# Patient Record
Sex: FEMALE | Race: BLACK OR AFRICAN AMERICAN | NOT HISPANIC OR LATINO | Employment: OTHER | RURAL
[De-identification: names, ages, dates, MRNs, and addresses within clinical notes are randomized per-mention and may not be internally consistent; named-entity substitution may affect disease eponyms.]

---

## 2021-03-24 ENCOUNTER — OFFICE VISIT (OUTPATIENT)
Dept: SPINE | Facility: CLINIC | Age: 71
End: 2021-03-24
Payer: COMMERCIAL

## 2021-03-24 ENCOUNTER — HOSPITAL ENCOUNTER (OUTPATIENT)
Dept: RADIOLOGY | Facility: HOSPITAL | Age: 71
Discharge: HOME | End: 2021-03-24
Attending: ORTHOPAEDIC SURGERY
Payer: COMMERCIAL

## 2021-03-24 DIAGNOSIS — M54.2 NECK PAIN: Primary | ICD-10-CM

## 2021-03-24 DIAGNOSIS — G95.9 CERVICAL MYELOPATHY: ICD-10-CM

## 2021-03-24 DIAGNOSIS — M54.12 CERVICAL RADICULOPATHY: ICD-10-CM

## 2021-03-24 DIAGNOSIS — M54.2 NECK PAIN: ICD-10-CM

## 2021-03-24 PROCEDURE — 72050 XR CERVICAL SPINE AP LAT WITH FLEX EXTEN: ICD-10-PCS | Mod: 26,,, | Performed by: ORTHOPAEDIC SURGERY

## 2021-03-24 PROCEDURE — 99999 PR PBB SHADOW E&M-NEW PATIENT-LVL IV: CPT | Mod: PBBFAC,,, | Performed by: ORTHOPAEDIC SURGERY

## 2021-03-24 PROCEDURE — 72050 X-RAY EXAM NECK SPINE 4/5VWS: CPT | Mod: TC

## 2021-03-24 PROCEDURE — 72050 X-RAY EXAM NECK SPINE 4/5VWS: CPT | Mod: 26,,, | Performed by: ORTHOPAEDIC SURGERY

## 2021-03-24 PROCEDURE — 99999 PR PBB SHADOW E&M-NEW PATIENT-LVL IV: ICD-10-PCS | Mod: PBBFAC,,, | Performed by: ORTHOPAEDIC SURGERY

## 2021-03-24 PROCEDURE — 99214 OFFICE O/P EST MOD 30 MIN: CPT | Mod: S$PBB,,, | Performed by: ORTHOPAEDIC SURGERY

## 2021-03-24 PROCEDURE — 99204 OFFICE O/P NEW MOD 45 MIN: CPT | Mod: PBBFAC,25 | Performed by: ORTHOPAEDIC SURGERY

## 2021-03-24 PROCEDURE — 99214 PR OFFICE/OUTPT VISIT, EST, LEVL IV, 30-39 MIN: ICD-10-PCS | Mod: S$PBB,,, | Performed by: ORTHOPAEDIC SURGERY

## 2021-03-24 RX ORDER — IPRATROPIUM BROMIDE AND ALBUTEROL SULFATE 2.5; .5 MG/3ML; MG/3ML
SOLUTION RESPIRATORY (INHALATION)
COMMUNITY
Start: 2021-02-02 | End: 2021-08-05

## 2021-03-24 RX ORDER — TIZANIDINE 4 MG/1
4 TABLET ORAL EVERY 8 HOURS PRN
COMMUNITY
End: 2021-03-31 | Stop reason: SDUPTHER

## 2021-03-24 RX ORDER — QUETIAPINE FUMARATE 25 MG/1
25 TABLET, FILM COATED ORAL
COMMUNITY

## 2021-03-24 RX ORDER — ISOSORBIDE DINITRATE 10 MG/1
10 TABLET ORAL
COMMUNITY

## 2021-03-24 RX ORDER — AMITRIPTYLINE HYDROCHLORIDE 25 MG/1
TABLET, FILM COATED ORAL
COMMUNITY
Start: 2021-03-11 | End: 2021-06-09 | Stop reason: SDUPTHER

## 2021-03-24 RX ORDER — MONTELUKAST SODIUM 10 MG/1
TABLET ORAL
COMMUNITY
Start: 2021-03-10 | End: 2021-07-13

## 2021-03-24 RX ORDER — RABEPRAZOLE SODIUM 20 MG/1
TABLET, DELAYED RELEASE ORAL
COMMUNITY
Start: 2021-03-11 | End: 2021-12-28

## 2021-03-24 RX ORDER — PREDNISONE 10 MG/1
TABLET ORAL
COMMUNITY
Start: 2021-03-19 | End: 2021-07-13

## 2021-03-24 RX ORDER — MIRABEGRON 25 MG/1
TABLET, FILM COATED, EXTENDED RELEASE ORAL
COMMUNITY

## 2021-03-24 RX ORDER — PANTOPRAZOLE SODIUM 40 MG/1
40 TABLET, DELAYED RELEASE ORAL
COMMUNITY
End: 2021-07-13

## 2021-03-24 RX ORDER — GABAPENTIN 600 MG/1
TABLET ORAL
COMMUNITY
End: 2021-06-09 | Stop reason: CLARIF

## 2021-03-24 RX ORDER — CHOLECALCIFEROL (VITAMIN D3) 25 MCG
1000 TABLET ORAL DAILY
COMMUNITY

## 2021-03-24 RX ORDER — DULOXETIN HYDROCHLORIDE 60 MG/1
60 CAPSULE, DELAYED RELEASE ORAL
COMMUNITY
End: 2021-06-09

## 2021-03-24 RX ORDER — FERROUS GLUCONATE 324(38)MG
324 TABLET ORAL 2 TIMES DAILY
COMMUNITY

## 2021-03-24 RX ORDER — TOPIRAMATE 25 MG/1
TABLET ORAL
COMMUNITY
Start: 2020-12-26 | End: 2021-07-13

## 2021-03-24 RX ORDER — AZITHROMYCIN 250 MG/1
TABLET, FILM COATED ORAL
COMMUNITY
Start: 2021-03-10 | End: 2021-07-13

## 2021-03-24 RX ORDER — CARVEDILOL 12.5 MG/1
12.5 TABLET ORAL
Status: ON HOLD | COMMUNITY
End: 2021-12-02

## 2021-03-24 RX ORDER — GABAPENTIN 600 MG/1
TABLET ORAL
COMMUNITY
Start: 2021-03-11 | End: 2021-06-09 | Stop reason: SDUPTHER

## 2021-03-24 RX ORDER — LISINOPRIL 10 MG/1
10 TABLET ORAL
COMMUNITY

## 2021-03-24 RX ORDER — LEVOTHYROXINE SODIUM 100 UG/1
100 TABLET ORAL
COMMUNITY
End: 2021-07-13

## 2021-03-24 RX ORDER — FUROSEMIDE 20 MG/1
TABLET ORAL
COMMUNITY
Start: 2021-03-10 | End: 2021-07-13

## 2021-03-24 RX ORDER — DULOXETIN HYDROCHLORIDE 30 MG/1
CAPSULE, DELAYED RELEASE ORAL
COMMUNITY
Start: 2021-03-11 | End: 2021-06-09 | Stop reason: SDUPTHER

## 2021-03-24 RX ORDER — MONTELUKAST SODIUM 10 MG/1
10 TABLET ORAL DAILY
COMMUNITY

## 2021-03-24 RX ORDER — MYCOPHENOLATE MOFETIL 500 MG/1
TABLET ORAL
COMMUNITY
Start: 2021-02-24

## 2021-03-24 RX ORDER — DIPHENOXYLATE HYDROCHLORIDE AND ATROPINE SULFATE 2.5; .025 MG/1; MG/1
TABLET ORAL
COMMUNITY
Start: 2021-03-11 | End: 2021-07-13

## 2021-03-24 RX ORDER — METOCLOPRAMIDE 5 MG/1
5 TABLET ORAL
COMMUNITY
End: 2021-12-28

## 2021-03-24 RX ORDER — AMLODIPINE BESYLATE 10 MG/1
10 TABLET ORAL
COMMUNITY

## 2021-03-24 RX ORDER — LEVOTHYROXINE SODIUM 75 UG/1
75 TABLET ORAL DAILY
COMMUNITY
Start: 2021-03-12

## 2021-03-24 RX ORDER — IPRATROPIUM BROMIDE AND ALBUTEROL SULFATE 2.5; .5 MG/3ML; MG/3ML
3 SOLUTION RESPIRATORY (INHALATION) EVERY 4 HOURS PRN
COMMUNITY

## 2021-03-24 RX ORDER — ISOSORBIDE MONONITRATE 30 MG/1
TABLET, EXTENDED RELEASE ORAL
COMMUNITY
Start: 2021-02-02 | End: 2021-07-13

## 2021-03-24 RX ORDER — NITROGLYCERIN 0.4 MG/1
0.4 TABLET SUBLINGUAL EVERY 5 MIN PRN
COMMUNITY

## 2021-03-24 RX ORDER — CHLORHEXIDINE GLUCONATE ORAL RINSE 1.2 MG/ML
SOLUTION DENTAL
COMMUNITY
Start: 2021-01-20 | End: 2021-07-13

## 2021-03-24 RX ORDER — HYDROCODONE BITARTRATE AND ACETAMINOPHEN 10; 325 MG/1; MG/1
TABLET ORAL
COMMUNITY
Start: 2021-03-15 | End: 2021-06-09

## 2021-03-24 RX ORDER — KETOROLAC TROMETHAMINE 10 MG/1
10 TABLET, FILM COATED ORAL EVERY 6 HOURS PRN
Status: ON HOLD | COMMUNITY
End: 2021-12-06 | Stop reason: HOSPADM

## 2021-03-24 RX ORDER — HYDROCODONE BITARTRATE AND ACETAMINOPHEN 10; 325 MG/1; MG/1
1 TABLET ORAL EVERY 6 HOURS PRN
COMMUNITY
End: 2021-06-09

## 2021-03-24 RX ORDER — OMEPRAZOLE 20 MG/1
CAPSULE, DELAYED RELEASE ORAL
COMMUNITY
Start: 2021-01-26 | End: 2021-07-13

## 2021-03-24 RX ORDER — POTASSIUM CHLORIDE 1.5 G/1.58G
20 POWDER, FOR SOLUTION ORAL 2 TIMES DAILY
COMMUNITY

## 2021-03-24 RX ORDER — ASPIRIN 81 MG/1
81 TABLET ORAL
COMMUNITY

## 2021-03-24 RX ORDER — TOPIRAMATE 25 MG/1
25 TABLET ORAL 2 TIMES DAILY
COMMUNITY
End: 2021-12-28

## 2021-03-24 RX ORDER — OXYBUTYNIN CHLORIDE
5 POWDER (GRAM) MISCELLANEOUS
COMMUNITY
End: 2021-07-13

## 2021-03-24 RX ORDER — SIMVASTATIN 40 MG/1
40 TABLET, FILM COATED ORAL
COMMUNITY

## 2021-03-24 RX ORDER — MIRABEGRON 50 MG/1
TABLET, FILM COATED, EXTENDED RELEASE ORAL
COMMUNITY
Start: 2021-03-11 | End: 2021-07-13

## 2021-03-24 RX ORDER — DULOXETIN HYDROCHLORIDE 60 MG/1
CAPSULE, DELAYED RELEASE ORAL
COMMUNITY
Start: 2021-02-24 | End: 2021-06-09

## 2021-03-24 RX ORDER — FUROSEMIDE 20 MG/1
20 TABLET ORAL DAILY
COMMUNITY

## 2021-03-24 RX ORDER — LEFLUNOMIDE 20 MG/1
20 TABLET ORAL
COMMUNITY
End: 2021-07-13

## 2021-03-24 RX ORDER — HYDROXYCHLOROQUINE SULFATE 200 MG/1
TABLET, FILM COATED ORAL
COMMUNITY
End: 2021-07-13

## 2021-03-24 RX ORDER — PREDNISONE 5 MG/1
TABLET ORAL
COMMUNITY
Start: 2021-01-26 | End: 2021-07-13

## 2021-03-31 DIAGNOSIS — M47.816 LUMBAR SPONDYLOSIS: Primary | ICD-10-CM

## 2021-03-31 RX ORDER — TIZANIDINE 4 MG/1
4 TABLET ORAL EVERY 8 HOURS
Qty: 90 TABLET | Refills: 2 | Status: SHIPPED | OUTPATIENT
Start: 2021-03-31 | End: 2021-06-09 | Stop reason: SDUPTHER

## 2021-06-09 ENCOUNTER — OFFICE VISIT (OUTPATIENT)
Dept: PAIN MEDICINE | Facility: CLINIC | Age: 71
End: 2021-06-09
Payer: COMMERCIAL

## 2021-06-09 VITALS
WEIGHT: 138 LBS | HEIGHT: 63 IN | HEART RATE: 96 BPM | BODY MASS INDEX: 24.45 KG/M2 | RESPIRATION RATE: 18 BRPM | SYSTOLIC BLOOD PRESSURE: 129 MMHG | DIASTOLIC BLOOD PRESSURE: 75 MMHG

## 2021-06-09 DIAGNOSIS — Z79.899 ENCOUNTER FOR LONG-TERM (CURRENT) USE OF OTHER MEDICATIONS: ICD-10-CM

## 2021-06-09 DIAGNOSIS — M47.816 LUMBAR SPONDYLOSIS: ICD-10-CM

## 2021-06-09 DIAGNOSIS — M32.9 LUPUS: Primary | Chronic | ICD-10-CM

## 2021-06-09 DIAGNOSIS — M47.817 LUMBOSACRAL SPONDYLOSIS WITHOUT MYELOPATHY: Chronic | ICD-10-CM

## 2021-06-09 LAB
CTP QC/QA: YES
POC (AMP) AMPHETAMINE: NEGATIVE
POC (BAR) BARBITURATES: NEGATIVE
POC (BUP) BUPRENORPHINE: NEGATIVE
POC (BZO) BENZODIAZEPINES: NEGATIVE
POC (COC) COCAINE: NEGATIVE
POC (MDMA) METHYLENEDIOXYMETHAMPHETAMINE 3,4: NEGATIVE
POC (MET) METHAMPHETAMINE: NEGATIVE
POC (MOP) OPIATES: ABNORMAL
POC (MTD) METHADONE: NEGATIVE
POC (OXY) OXYCODONE: NEGATIVE
POC (PCP) PHENCYCLIDINE: NEGATIVE
POC (TCA) TRICYCLIC ANTIDEPRESSANTS: NEGATIVE
POC TEMPERATURE (URINE): 92
POC THC: NEGATIVE

## 2021-06-09 PROCEDURE — 80305 DRUG TEST PRSMV DIR OPT OBS: CPT | Mod: PBBFAC | Performed by: PHYSICIAN ASSISTANT

## 2021-06-09 PROCEDURE — 99214 OFFICE O/P EST MOD 30 MIN: CPT | Mod: S$PBB,25,, | Performed by: PHYSICIAN ASSISTANT

## 2021-06-09 PROCEDURE — 99215 OFFICE O/P EST HI 40 MIN: CPT | Mod: PBBFAC,25 | Performed by: PHYSICIAN ASSISTANT

## 2021-06-09 PROCEDURE — 99214 PR OFFICE/OUTPT VISIT, EST, LEVL IV, 30-39 MIN: ICD-10-PCS | Mod: S$PBB,25,, | Performed by: PHYSICIAN ASSISTANT

## 2021-06-09 PROCEDURE — 96372 THER/PROPH/DIAG INJ SC/IM: CPT | Mod: PBBFAC | Performed by: PHYSICIAN ASSISTANT

## 2021-06-09 RX ORDER — AMITRIPTYLINE HYDROCHLORIDE 25 MG/1
25 TABLET, FILM COATED ORAL NIGHTLY
Qty: 30 TABLET | Refills: 2 | Status: SHIPPED | OUTPATIENT
Start: 2021-06-09 | End: 2021-09-09 | Stop reason: SDUPTHER

## 2021-06-09 RX ORDER — BUDESONIDE, GLYCOPYRROLATE, AND FORMOTEROL FUMARATE 160; 9; 4.8 UG/1; UG/1; UG/1
AEROSOL, METERED RESPIRATORY (INHALATION)
COMMUNITY

## 2021-06-09 RX ORDER — HYDROCODONE BITARTRATE AND ACETAMINOPHEN 10; 325 MG/1; MG/1
1 TABLET ORAL EVERY 6 HOURS
Qty: 120 TABLET | Refills: 0 | Status: SHIPPED | OUTPATIENT
Start: 2021-07-13 | End: 2021-09-09 | Stop reason: SDUPTHER

## 2021-06-09 RX ORDER — TIZANIDINE 4 MG/1
4 TABLET ORAL EVERY 8 HOURS
Qty: 90 TABLET | Refills: 2 | Status: ON HOLD | OUTPATIENT
Start: 2021-06-09 | End: 2021-09-08 | Stop reason: HOSPADM

## 2021-06-09 RX ORDER — TRIAMCINOLONE ACETONIDE 40 MG/ML
40 INJECTION, SUSPENSION INTRA-ARTICULAR; INTRAMUSCULAR
Status: COMPLETED | OUTPATIENT
Start: 2021-06-09 | End: 2021-06-09

## 2021-06-09 RX ORDER — HYDROCODONE BITARTRATE AND ACETAMINOPHEN 10; 325 MG/1; MG/1
1 TABLET ORAL EVERY 6 HOURS
Qty: 120 TABLET | Refills: 0 | Status: SHIPPED | OUTPATIENT
Start: 2021-08-12 | End: 2021-07-13

## 2021-06-09 RX ORDER — HYDROCODONE BITARTRATE AND ACETAMINOPHEN 10; 325 MG/1; MG/1
1 TABLET ORAL EVERY 6 HOURS
Qty: 120 TABLET | Refills: 0 | Status: SHIPPED | OUTPATIENT
Start: 2021-06-12 | End: 2021-07-12

## 2021-06-09 RX ORDER — GABAPENTIN 600 MG/1
600 TABLET ORAL EVERY 8 HOURS
Qty: 90 TABLET | Refills: 2 | Status: SHIPPED | OUTPATIENT
Start: 2021-06-09 | End: 2021-09-08 | Stop reason: SDUPTHER

## 2021-06-09 RX ORDER — DULOXETIN HYDROCHLORIDE 30 MG/1
30 CAPSULE, DELAYED RELEASE ORAL 2 TIMES DAILY
Qty: 60 CAPSULE | Refills: 2 | Status: SHIPPED | OUTPATIENT
Start: 2021-06-09 | End: 2021-09-09 | Stop reason: SDUPTHER

## 2021-06-09 RX ADMIN — TRIAMCINOLONE ACETONIDE 40 MG: 40 INJECTION, SUSPENSION INTRA-ARTICULAR; INTRAMUSCULAR at 01:06

## 2021-06-30 ENCOUNTER — OFFICE VISIT (OUTPATIENT)
Dept: SPINE | Facility: CLINIC | Age: 71
End: 2021-06-30
Payer: COMMERCIAL

## 2021-06-30 DIAGNOSIS — M50.30 DDD (DEGENERATIVE DISC DISEASE), CERVICAL: ICD-10-CM

## 2021-06-30 DIAGNOSIS — M54.9 DORSALGIA, UNSPECIFIED: ICD-10-CM

## 2021-06-30 DIAGNOSIS — M51.36 DDD (DEGENERATIVE DISC DISEASE), LUMBAR: ICD-10-CM

## 2021-06-30 DIAGNOSIS — M54.12 RADICULOPATHY, CERVICAL REGION: ICD-10-CM

## 2021-06-30 DIAGNOSIS — Z98.1 ARTHRODESIS STATUS: ICD-10-CM

## 2021-06-30 DIAGNOSIS — M47.817 LUMBOSACRAL SPONDYLOSIS WITHOUT MYELOPATHY: Primary | ICD-10-CM

## 2021-06-30 DIAGNOSIS — M54.16 LUMBAR RADICULOPATHY: ICD-10-CM

## 2021-06-30 PROCEDURE — 99215 OFFICE O/P EST HI 40 MIN: CPT | Mod: PBBFAC | Performed by: ORTHOPAEDIC SURGERY

## 2021-06-30 PROCEDURE — 99214 PR OFFICE/OUTPT VISIT, EST, LEVL IV, 30-39 MIN: ICD-10-PCS | Mod: S$PBB,,, | Performed by: ORTHOPAEDIC SURGERY

## 2021-06-30 PROCEDURE — 99214 OFFICE O/P EST MOD 30 MIN: CPT | Mod: S$PBB,,, | Performed by: ORTHOPAEDIC SURGERY

## 2021-07-13 ENCOUNTER — HOSPITAL ENCOUNTER (OUTPATIENT)
Dept: RADIOLOGY | Facility: HOSPITAL | Age: 71
Discharge: HOME | End: 2021-07-13
Attending: ORTHOPAEDIC SURGERY
Payer: COMMERCIAL

## 2021-07-13 VITALS
SYSTOLIC BLOOD PRESSURE: 144 MMHG | TEMPERATURE: 98 F | WEIGHT: 144 LBS | OXYGEN SATURATION: 98 % | HEIGHT: 63 IN | DIASTOLIC BLOOD PRESSURE: 80 MMHG | HEART RATE: 91 BPM | RESPIRATION RATE: 16 BRPM | BODY MASS INDEX: 25.52 KG/M2

## 2021-07-13 DIAGNOSIS — M54.9 DORSALGIA, UNSPECIFIED: ICD-10-CM

## 2021-07-13 DIAGNOSIS — M54.12 RADICULOPATHY, CERVICAL REGION: ICD-10-CM

## 2021-07-13 DIAGNOSIS — M47.817 LUMBOSACRAL SPONDYLOSIS WITHOUT MYELOPATHY: ICD-10-CM

## 2021-07-13 DIAGNOSIS — Z98.1 ARTHRODESIS STATUS: ICD-10-CM

## 2021-07-13 LAB
APTT PPP: 25.2 SECONDS (ref 25.2–37.3)
BASOPHILS # BLD AUTO: 0.03 K/UL (ref 0–0.2)
BASOPHILS NFR BLD AUTO: 0.4 % (ref 0–1)
DIFFERENTIAL METHOD BLD: ABNORMAL
EOSINOPHIL # BLD AUTO: 0.14 K/UL (ref 0–0.5)
EOSINOPHIL NFR BLD AUTO: 1.9 % (ref 1–4)
ERYTHROCYTE [DISTWIDTH] IN BLOOD BY AUTOMATED COUNT: 13.7 % (ref 11.5–14.5)
HCT VFR BLD AUTO: 35.6 % (ref 38–47)
HGB BLD-MCNC: 10.9 G/DL (ref 12–16)
HOLD SPECIMEN: NORMAL
IMM GRANULOCYTES # BLD AUTO: 0.15 K/UL (ref 0–0.04)
IMM GRANULOCYTES NFR BLD: 2 % (ref 0–0.4)
INR BLD: 0.9 (ref 0.9–1.1)
LYMPHOCYTES # BLD AUTO: 1.21 K/UL (ref 1–4.8)
LYMPHOCYTES NFR BLD AUTO: 16.4 % (ref 27–41)
MCH RBC QN AUTO: 25.7 PG (ref 27–31)
MCHC RBC AUTO-ENTMCNC: 30.6 G/DL (ref 32–36)
MCV RBC AUTO: 84 FL (ref 80–96)
MONOCYTES # BLD AUTO: 0.76 K/UL (ref 0–0.8)
MONOCYTES NFR BLD AUTO: 10.3 % (ref 2–6)
MPC BLD CALC-MCNC: 11.3 FL (ref 9.4–12.4)
NEUTROPHILS # BLD AUTO: 5.07 K/UL (ref 1.8–7.7)
NEUTROPHILS NFR BLD AUTO: 69 % (ref 53–65)
NRBC # BLD AUTO: 0 X10E3/UL
NRBC, AUTO (.00): 0 %
OVALOCYTES BLD QL SMEAR: ABNORMAL
PLATELET # BLD AUTO: 233 K/UL (ref 150–400)
PLATELET MORPHOLOGY: ABNORMAL
POLYCHROMASIA BLD QL SMEAR: ABNORMAL
PROTHROMBIN TIME: 12.2 SECONDS (ref 11.7–14.7)
RBC # BLD AUTO: 4.24 M/UL (ref 4.2–5.4)
WBC # BLD AUTO: 7.36 K/UL (ref 4.5–11)

## 2021-07-13 PROCEDURE — 72132 CT LUMBAR SPINE W/DYE: CPT | Mod: TC

## 2021-07-13 PROCEDURE — 85730 THROMBOPLASTIN TIME PARTIAL: CPT | Performed by: RADIOLOGY

## 2021-07-13 PROCEDURE — 72132 CT LUMBAR SPINE W/DYE: CPT | Mod: 26,,, | Performed by: RADIOLOGY

## 2021-07-13 PROCEDURE — 63600175 PHARM REV CODE 636 W HCPCS: Performed by: RADIOLOGY

## 2021-07-13 PROCEDURE — 72126 CT NECK SPINE W/DYE: CPT | Mod: 26,,, | Performed by: RADIOLOGY

## 2021-07-13 PROCEDURE — 85025 COMPLETE CBC W/AUTO DIFF WBC: CPT | Performed by: RADIOLOGY

## 2021-07-13 PROCEDURE — 85610 PROTHROMBIN TIME: CPT | Performed by: RADIOLOGY

## 2021-07-13 PROCEDURE — 72126 CT CERVICAL SPINE WITH CONTRAST: ICD-10-PCS | Mod: 26,,, | Performed by: RADIOLOGY

## 2021-07-13 PROCEDURE — 25000003 PHARM REV CODE 250: Performed by: RADIOLOGY

## 2021-07-13 PROCEDURE — 72132 CT LUMBAR SPINE WITH CONTRAST: ICD-10-PCS | Mod: 26,,, | Performed by: RADIOLOGY

## 2021-07-13 PROCEDURE — 25500020 PHARM REV CODE 255: Performed by: ORTHOPAEDIC SURGERY

## 2021-07-13 PROCEDURE — 36415 COLL VENOUS BLD VENIPUNCTURE: CPT | Performed by: RADIOLOGY

## 2021-07-13 PROCEDURE — 72126 CT NECK SPINE W/DYE: CPT | Mod: TC

## 2021-07-13 PROCEDURE — 27201268 FL MYELOGRAM 2 OR MORE REGIONS

## 2021-07-13 RX ORDER — IOPAMIDOL 408 MG/ML
12 INJECTION, SOLUTION INTRATHECAL
Status: COMPLETED | OUTPATIENT
Start: 2021-07-13 | End: 2021-07-13

## 2021-07-13 RX ORDER — FAMOTIDINE 10 MG/ML
20 INJECTION INTRAVENOUS 2 TIMES DAILY
Status: DISCONTINUED | OUTPATIENT
Start: 2021-07-13 | End: 2021-07-14 | Stop reason: HOSPADM

## 2021-07-13 RX ORDER — DIAZEPAM 5 MG/1
5 TABLET ORAL ONCE
Status: COMPLETED | OUTPATIENT
Start: 2021-07-13 | End: 2021-07-13

## 2021-07-13 RX ORDER — METHYLPREDNISOLONE SOD SUCC 125 MG
125 VIAL (EA) INJECTION EVERY 6 HOURS
Status: DISCONTINUED | OUTPATIENT
Start: 2021-07-13 | End: 2021-07-14 | Stop reason: HOSPADM

## 2021-07-13 RX ORDER — SODIUM CHLORIDE 450 MG/100ML
INJECTION, SOLUTION INTRAVENOUS ONCE
Status: COMPLETED | OUTPATIENT
Start: 2021-07-13 | End: 2021-07-13

## 2021-07-13 RX ORDER — DIPHENHYDRAMINE HYDROCHLORIDE 50 MG/ML
50 INJECTION INTRAMUSCULAR; INTRAVENOUS ONCE
Status: COMPLETED | OUTPATIENT
Start: 2021-07-13 | End: 2021-07-13

## 2021-07-13 RX ADMIN — FAMOTIDINE 20 MG: 10 INJECTION, SOLUTION INTRAVENOUS at 10:07

## 2021-07-13 RX ADMIN — DIAZEPAM 5 MG: 5 TABLET ORAL at 10:07

## 2021-07-13 RX ADMIN — METHYLPREDNISOLONE SODIUM SUCCINATE 125 MG: 125 INJECTION, POWDER, FOR SOLUTION INTRAMUSCULAR; INTRAVENOUS at 11:07

## 2021-07-13 RX ADMIN — IOPAMIDOL 12 ML: 408 INJECTION, SOLUTION INTRATHECAL at 12:07

## 2021-07-13 RX ADMIN — DIPHENHYDRAMINE HYDROCHLORIDE 50 MG: 50 INJECTION INTRAMUSCULAR; INTRAVENOUS at 10:07

## 2021-07-13 RX ADMIN — SODIUM CHLORIDE: 4.5 INJECTION, SOLUTION INTRAVENOUS at 09:07

## 2021-07-19 ENCOUNTER — OFFICE VISIT (OUTPATIENT)
Dept: SPINE | Facility: CLINIC | Age: 71
End: 2021-07-19
Payer: COMMERCIAL

## 2021-07-19 DIAGNOSIS — M51.36 DDD (DEGENERATIVE DISC DISEASE), LUMBAR: ICD-10-CM

## 2021-07-19 DIAGNOSIS — M54.16 LUMBAR RADICULOPATHY: ICD-10-CM

## 2021-07-19 DIAGNOSIS — M54.12 RADICULOPATHY, CERVICAL REGION: ICD-10-CM

## 2021-07-19 DIAGNOSIS — G95.9 CERVICAL MYELOPATHY: Primary | ICD-10-CM

## 2021-07-19 DIAGNOSIS — M50.30 DDD (DEGENERATIVE DISC DISEASE), CERVICAL: ICD-10-CM

## 2021-07-19 DIAGNOSIS — M48.062 LUMBAR STENOSIS WITH NEUROGENIC CLAUDICATION: ICD-10-CM

## 2021-07-19 PROCEDURE — 99213 OFFICE O/P EST LOW 20 MIN: CPT | Mod: PBBFAC | Performed by: ORTHOPAEDIC SURGERY

## 2021-07-19 PROCEDURE — 99214 PR OFFICE/OUTPT VISIT, EST, LEVL IV, 30-39 MIN: ICD-10-PCS | Mod: S$PBB,,, | Performed by: ORTHOPAEDIC SURGERY

## 2021-07-19 PROCEDURE — 99214 OFFICE O/P EST MOD 30 MIN: CPT | Mod: S$PBB,,, | Performed by: ORTHOPAEDIC SURGERY

## 2021-07-19 RX ORDER — SODIUM CHLORIDE 9 MG/ML
INJECTION, SOLUTION INTRAVENOUS CONTINUOUS
Status: CANCELLED | OUTPATIENT
Start: 2021-07-19

## 2021-07-20 ENCOUNTER — TELEPHONE (OUTPATIENT)
Dept: SPINE | Facility: CLINIC | Age: 71
End: 2021-07-20

## 2021-08-05 RX ORDER — NITROFURANTOIN 25; 75 MG/1; MG/1
CAPSULE ORAL
COMMUNITY
Start: 2021-05-24 | End: 2021-12-28

## 2021-08-05 RX ORDER — CIPROFLOXACIN 500 MG/1
TABLET ORAL
COMMUNITY
Start: 2021-05-14

## 2021-08-05 RX ORDER — DULOXETIN HYDROCHLORIDE 60 MG/1
CAPSULE, DELAYED RELEASE ORAL
Status: ON HOLD | COMMUNITY
Start: 2021-07-08 | End: 2021-09-08

## 2021-08-05 RX ORDER — POTASSIUM CHLORIDE 750 MG/1
TABLET, EXTENDED RELEASE ORAL
COMMUNITY
Start: 2021-06-08 | End: 2021-08-05

## 2021-08-31 DIAGNOSIS — M54.9 DORSALGIA, UNSPECIFIED: ICD-10-CM

## 2021-09-01 ENCOUNTER — OFFICE VISIT (OUTPATIENT)
Dept: SPINE | Facility: CLINIC | Age: 71
End: 2021-09-01
Payer: COMMERCIAL

## 2021-09-01 ENCOUNTER — HOSPITAL ENCOUNTER (OUTPATIENT)
Dept: RADIOLOGY | Facility: HOSPITAL | Age: 71
Discharge: HOME | End: 2021-09-01
Attending: ORTHOPAEDIC SURGERY
Payer: COMMERCIAL

## 2021-09-01 DIAGNOSIS — M54.9 DORSALGIA, UNSPECIFIED: ICD-10-CM

## 2021-09-01 DIAGNOSIS — M48.062 LUMBAR STENOSIS WITH NEUROGENIC CLAUDICATION: ICD-10-CM

## 2021-09-01 DIAGNOSIS — G95.9 CERVICAL MYELOPATHY: Primary | ICD-10-CM

## 2021-09-01 DIAGNOSIS — M54.12 RADICULOPATHY, CERVICAL REGION: ICD-10-CM

## 2021-09-01 DIAGNOSIS — Z98.1 ARTHRODESIS STATUS: ICD-10-CM

## 2021-09-01 DIAGNOSIS — M51.36 DDD (DEGENERATIVE DISC DISEASE), LUMBAR: ICD-10-CM

## 2021-09-01 DIAGNOSIS — M50.30 DDD (DEGENERATIVE DISC DISEASE), CERVICAL: ICD-10-CM

## 2021-09-01 LAB
CTP QC/QA: YES
SARS-COV-2 RDRP RESP QL NAA+PROBE: NEGATIVE

## 2021-09-01 PROCEDURE — 72110 X-RAY EXAM L-2 SPINE 4/>VWS: CPT | Mod: 26,,, | Performed by: ORTHOPAEDIC SURGERY

## 2021-09-01 PROCEDURE — 99215 PR OFFICE/OUTPT VISIT, EST, LEVL V, 40-54 MIN: ICD-10-PCS | Mod: S$PBB,,, | Performed by: ORTHOPAEDIC SURGERY

## 2021-09-01 PROCEDURE — 72110 XR LUMBAR SPINE 5 VIEW WITH FLEX AND EXT: ICD-10-PCS | Mod: 26,,, | Performed by: ORTHOPAEDIC SURGERY

## 2021-09-01 PROCEDURE — 72110 X-RAY EXAM L-2 SPINE 4/>VWS: CPT | Mod: TC

## 2021-09-01 PROCEDURE — 99215 OFFICE O/P EST HI 40 MIN: CPT | Mod: S$PBB,,, | Performed by: ORTHOPAEDIC SURGERY

## 2021-09-01 PROCEDURE — U0002 COVID-19 LAB TEST NON-CDC: HCPCS | Mod: PBBFAC | Performed by: ORTHOPAEDIC SURGERY

## 2021-09-01 PROCEDURE — 99213 OFFICE O/P EST LOW 20 MIN: CPT | Mod: PBBFAC | Performed by: ORTHOPAEDIC SURGERY

## 2021-09-01 RX ORDER — PROMETHAZINE HYDROCHLORIDE 25 MG/1
25 TABLET ORAL EVERY 6 HOURS PRN
Qty: 45 TABLET | Refills: 0 | Status: SHIPPED | OUTPATIENT
Start: 2021-09-01

## 2021-09-01 RX ORDER — CYCLOBENZAPRINE HCL 5 MG
5 TABLET ORAL 3 TIMES DAILY PRN
Qty: 45 TABLET | Refills: 0 | Status: SHIPPED | OUTPATIENT
Start: 2021-09-01 | End: 2021-09-11

## 2021-09-01 RX ORDER — OXYCODONE AND ACETAMINOPHEN 5; 325 MG/1; MG/1
1 TABLET ORAL EVERY 4 HOURS PRN
Qty: 45 TABLET | Refills: 0 | Status: SHIPPED | OUTPATIENT
Start: 2021-09-01

## 2021-09-07 ENCOUNTER — ANESTHESIA (OUTPATIENT)
Dept: SURGERY | Facility: HOSPITAL | Age: 71
DRG: 473 | End: 2021-09-07
Payer: COMMERCIAL

## 2021-09-07 ENCOUNTER — HOSPITAL ENCOUNTER (INPATIENT)
Facility: HOSPITAL | Age: 71
LOS: 1 days | Discharge: HOME HEALTH | DRG: 473 | End: 2021-09-08
Attending: ORTHOPAEDIC SURGERY | Admitting: ORTHOPAEDIC SURGERY
Payer: COMMERCIAL

## 2021-09-07 ENCOUNTER — ANESTHESIA EVENT (OUTPATIENT)
Dept: SURGERY | Facility: HOSPITAL | Age: 71
DRG: 473 | End: 2021-09-07
Payer: COMMERCIAL

## 2021-09-07 DIAGNOSIS — I10 HTN (HYPERTENSION): ICD-10-CM

## 2021-09-07 DIAGNOSIS — M54.12 RADICULOPATHY, CERVICAL REGION: ICD-10-CM

## 2021-09-07 PROBLEM — K21.9 GASTROESOPHAGEAL REFLUX DISEASE WITHOUT ESOPHAGITIS: Status: ACTIVE | Noted: 2020-09-21

## 2021-09-07 PROBLEM — R13.19 ESOPHAGEAL DYSPHAGIA: Status: ACTIVE | Noted: 2020-08-03

## 2021-09-07 PROBLEM — E78.5 HYPERLIPIDEMIA: Chronic | Status: ACTIVE | Noted: 2021-09-07

## 2021-09-07 PROBLEM — J44.9 COPD (CHRONIC OBSTRUCTIVE PULMONARY DISEASE): Chronic | Status: ACTIVE | Noted: 2021-09-07

## 2021-09-07 PROBLEM — K31.84 GASTROPARESIS: Status: ACTIVE | Noted: 2020-09-21

## 2021-09-07 PROBLEM — E03.9 HYPOTHYROIDISM: Chronic | Status: ACTIVE | Noted: 2021-09-07

## 2021-09-07 LAB
ALBUMIN SERPL BCP-MCNC: 3.6 G/DL (ref 3.5–5)
ALBUMIN/GLOB SERPL: 1.1 {RATIO}
ALP SERPL-CCNC: 89 U/L (ref 55–142)
ALT SERPL W P-5'-P-CCNC: 19 U/L (ref 13–56)
ANION GAP SERPL CALCULATED.3IONS-SCNC: 15 MMOL/L (ref 7–16)
AST SERPL W P-5'-P-CCNC: 17 U/L (ref 15–37)
BASOPHILS # BLD AUTO: 0.02 K/UL (ref 0–0.2)
BASOPHILS NFR BLD AUTO: 0.3 % (ref 0–1)
BILIRUB SERPL-MCNC: 0.3 MG/DL (ref 0–1.2)
BUN SERPL-MCNC: 7 MG/DL (ref 7–18)
BUN/CREAT SERPL: 11 (ref 6–20)
CALCIUM SERPL-MCNC: 8.5 MG/DL (ref 8.5–10.1)
CHLORIDE SERPL-SCNC: 107 MMOL/L (ref 98–107)
CO2 SERPL-SCNC: 26 MMOL/L (ref 21–32)
CREAT SERPL-MCNC: 0.65 MG/DL (ref 0.55–1.02)
DIFFERENTIAL METHOD BLD: ABNORMAL
EOSINOPHIL # BLD AUTO: 0.1 K/UL (ref 0–0.5)
EOSINOPHIL NFR BLD AUTO: 1.6 % (ref 1–4)
ERYTHROCYTE [DISTWIDTH] IN BLOOD BY AUTOMATED COUNT: 14 % (ref 11.5–14.5)
GLOBULIN SER-MCNC: 3.3 G/DL (ref 2–4)
GLUCOSE SERPL-MCNC: 167 MG/DL (ref 74–106)
HCT VFR BLD AUTO: 37.7 % (ref 38–47)
HGB BLD-MCNC: 11.7 G/DL (ref 12–16)
IMM GRANULOCYTES # BLD AUTO: 0.11 K/UL (ref 0–0.04)
IMM GRANULOCYTES NFR BLD: 1.7 % (ref 0–0.4)
INDIRECT COOMBS: NORMAL
LYMPHOCYTES # BLD AUTO: 1.44 K/UL (ref 1–4.8)
LYMPHOCYTES NFR BLD AUTO: 22.8 % (ref 27–41)
MCH RBC QN AUTO: 25.3 PG (ref 27–31)
MCHC RBC AUTO-ENTMCNC: 31 G/DL (ref 32–36)
MCV RBC AUTO: 81.4 FL (ref 80–96)
MONOCYTES # BLD AUTO: 0.84 K/UL (ref 0–0.8)
MONOCYTES NFR BLD AUTO: 13.3 % (ref 2–6)
MPC BLD CALC-MCNC: 10.4 FL (ref 9.4–12.4)
NEUTROPHILS # BLD AUTO: 3.8 K/UL (ref 1.8–7.7)
NEUTROPHILS NFR BLD AUTO: 60.3 % (ref 53–65)
NRBC # BLD AUTO: 0 X10E3/UL
NRBC, AUTO (.00): 0 %
PLATELET # BLD AUTO: 317 K/UL (ref 150–400)
POTASSIUM SERPL-SCNC: 3.5 MMOL/L (ref 3.5–5.1)
PROT SERPL-MCNC: 6.9 G/DL (ref 6.4–8.2)
RBC # BLD AUTO: 4.63 M/UL (ref 4.2–5.4)
RH BLD: NORMAL
SODIUM SERPL-SCNC: 144 MMOL/L (ref 136–145)
WBC # BLD AUTO: 6.31 K/UL (ref 4.5–11)

## 2021-09-07 PROCEDURE — 93005 ELECTROCARDIOGRAM TRACING: CPT

## 2021-09-07 PROCEDURE — 27800903 OPTIME MED/SURG SUP & DEVICES OTHER IMPLANTS: Performed by: ORTHOPAEDIC SURGERY

## 2021-09-07 PROCEDURE — 37000008 HC ANESTHESIA 1ST 15 MINUTES: Performed by: ORTHOPAEDIC SURGERY

## 2021-09-07 PROCEDURE — C1769 GUIDE WIRE: HCPCS | Performed by: ORTHOPAEDIC SURGERY

## 2021-09-07 PROCEDURE — D9220A PRA ANESTHESIA: Mod: ANES,,, | Performed by: ANESTHESIOLOGY

## 2021-09-07 PROCEDURE — 63600175 PHARM REV CODE 636 W HCPCS: Performed by: ANESTHESIOLOGY

## 2021-09-07 PROCEDURE — 36415 COLL VENOUS BLD VENIPUNCTURE: CPT | Performed by: INTERNAL MEDICINE

## 2021-09-07 PROCEDURE — 20930 PR ALLOGRAFT FOR SPINE SURGERY ONLY MORSELIZED: ICD-10-PCS | Mod: ,,, | Performed by: ORTHOPAEDIC SURGERY

## 2021-09-07 PROCEDURE — 63600175 PHARM REV CODE 636 W HCPCS: Performed by: NURSE ANESTHETIST, CERTIFIED REGISTERED

## 2021-09-07 PROCEDURE — 25000003 PHARM REV CODE 250: Performed by: ORTHOPAEDIC SURGERY

## 2021-09-07 PROCEDURE — 36000711: Performed by: ORTHOPAEDIC SURGERY

## 2021-09-07 PROCEDURE — 99900035 HC TECH TIME PER 15 MIN (STAT)

## 2021-09-07 PROCEDURE — 22551 PR ARTHRODESIS ANT INTERBODY INC DISCECTOMY, CERVICAL BELOW C2: ICD-10-PCS | Mod: ,,, | Performed by: ORTHOPAEDIC SURGERY

## 2021-09-07 PROCEDURE — 36000710: Performed by: ORTHOPAEDIC SURGERY

## 2021-09-07 PROCEDURE — 93010 EKG 12-LEAD: ICD-10-PCS | Mod: ,,, | Performed by: HOSPITALIST

## 2021-09-07 PROCEDURE — 99223 PR INITIAL HOSPITAL CARE,LEVL III: ICD-10-PCS | Mod: ,,, | Performed by: INTERNAL MEDICINE

## 2021-09-07 PROCEDURE — 22551 ARTHRD ANT NTRBDY CERVICAL: CPT | Mod: ,,, | Performed by: ORTHOPAEDIC SURGERY

## 2021-09-07 PROCEDURE — 36415 COLL VENOUS BLD VENIPUNCTURE: CPT | Performed by: ORTHOPAEDIC SURGERY

## 2021-09-07 PROCEDURE — 71000033 HC RECOVERY, INTIAL HOUR: Performed by: ORTHOPAEDIC SURGERY

## 2021-09-07 PROCEDURE — 85025 COMPLETE CBC W/AUTO DIFF WBC: CPT | Performed by: ORTHOPAEDIC SURGERY

## 2021-09-07 PROCEDURE — 63600175 PHARM REV CODE 636 W HCPCS: Performed by: ORTHOPAEDIC SURGERY

## 2021-09-07 PROCEDURE — D9220A PRA ANESTHESIA: ICD-10-PCS | Mod: ANES,,, | Performed by: ANESTHESIOLOGY

## 2021-09-07 PROCEDURE — C1713 ANCHOR/SCREW BN/BN,TIS/BN: HCPCS | Performed by: ORTHOPAEDIC SURGERY

## 2021-09-07 PROCEDURE — 99223 1ST HOSP IP/OBS HIGH 75: CPT | Mod: ,,, | Performed by: INTERNAL MEDICINE

## 2021-09-07 PROCEDURE — 97161 PT EVAL LOW COMPLEX 20 MIN: CPT

## 2021-09-07 PROCEDURE — 27201423 OPTIME MED/SURG SUP & DEVICES STERILE SUPPLY: Performed by: ORTHOPAEDIC SURGERY

## 2021-09-07 PROCEDURE — 93010 ELECTROCARDIOGRAM REPORT: CPT | Mod: ,,, | Performed by: HOSPITALIST

## 2021-09-07 PROCEDURE — D9220A PRA ANESTHESIA: ICD-10-PCS | Mod: CRNA,,, | Performed by: NURSE ANESTHETIST, CERTIFIED REGISTERED

## 2021-09-07 PROCEDURE — 25000003 PHARM REV CODE 250: Performed by: NURSE ANESTHETIST, CERTIFIED REGISTERED

## 2021-09-07 PROCEDURE — 20930 SP BONE ALGRFT MORSEL ADD-ON: CPT | Mod: ,,, | Performed by: ORTHOPAEDIC SURGERY

## 2021-09-07 PROCEDURE — 71000039 HC RECOVERY, EACH ADD'L HOUR: Performed by: ORTHOPAEDIC SURGERY

## 2021-09-07 PROCEDURE — 63600175 PHARM REV CODE 636 W HCPCS: Performed by: NURSE PRACTITIONER

## 2021-09-07 PROCEDURE — 37000009 HC ANESTHESIA EA ADD 15 MINS: Performed by: ORTHOPAEDIC SURGERY

## 2021-09-07 PROCEDURE — 22853 INSJ BIOMECHANICAL DEVICE: CPT | Mod: ,,, | Performed by: ORTHOPAEDIC SURGERY

## 2021-09-07 PROCEDURE — 22853 PR INSERT BIOMECH DEV W/INTERBODY ARTHRODESIS, EA CONTIGUOUS DEFECT: ICD-10-PCS | Mod: ,,, | Performed by: ORTHOPAEDIC SURGERY

## 2021-09-07 PROCEDURE — 86900 BLOOD TYPING SEROLOGIC ABO: CPT | Performed by: ORTHOPAEDIC SURGERY

## 2021-09-07 PROCEDURE — 80053 COMPREHEN METABOLIC PANEL: CPT | Performed by: INTERNAL MEDICINE

## 2021-09-07 PROCEDURE — 11000001 HC ACUTE MED/SURG PRIVATE ROOM

## 2021-09-07 PROCEDURE — D9220A PRA ANESTHESIA: Mod: CRNA,,, | Performed by: NURSE ANESTHETIST, CERTIFIED REGISTERED

## 2021-09-07 DEVICE — IMPLANTABLE DEVICE: Type: IMPLANTABLE DEVICE | Site: SPINE CERVICAL | Status: FUNCTIONAL

## 2021-09-07 DEVICE — IMP CELLULAR GRAFT MOLDABLE MEDIUM: Type: IMPLANTABLE DEVICE | Site: SPINE CERVICAL | Status: FUNCTIONAL

## 2021-09-07 RX ORDER — MIDAZOLAM HYDROCHLORIDE 1 MG/ML
INJECTION INTRAMUSCULAR; INTRAVENOUS
Status: DISCONTINUED | OUTPATIENT
Start: 2021-09-07 | End: 2021-09-07

## 2021-09-07 RX ORDER — CEFAZOLIN SODIUM 2 G/50ML
2 SOLUTION INTRAVENOUS
Status: DISCONTINUED | OUTPATIENT
Start: 2021-09-07 | End: 2021-09-07 | Stop reason: HOSPADM

## 2021-09-07 RX ORDER — LEVOTHYROXINE SODIUM 75 UG/1
75 TABLET ORAL
Status: DISCONTINUED | OUTPATIENT
Start: 2021-09-08 | End: 2021-09-08 | Stop reason: HOSPADM

## 2021-09-07 RX ORDER — OXYCODONE HCL 10 MG/1
10 TABLET, FILM COATED, EXTENDED RELEASE ORAL ONCE
Status: COMPLETED | OUTPATIENT
Start: 2021-09-07 | End: 2021-09-07

## 2021-09-07 RX ORDER — METOCLOPRAMIDE HYDROCHLORIDE 5 MG/ML
5 INJECTION INTRAMUSCULAR; INTRAVENOUS EVERY 6 HOURS PRN
Status: DISCONTINUED | OUTPATIENT
Start: 2021-09-07 | End: 2021-09-08 | Stop reason: HOSPADM

## 2021-09-07 RX ORDER — DOCUSATE SODIUM 100 MG/1
100 CAPSULE, LIQUID FILLED ORAL 2 TIMES DAILY
Status: DISCONTINUED | OUTPATIENT
Start: 2021-09-07 | End: 2021-09-08 | Stop reason: HOSPADM

## 2021-09-07 RX ORDER — OXYCODONE HYDROCHLORIDE 5 MG/1
10 TABLET ORAL EVERY 4 HOURS PRN
Status: DISCONTINUED | OUTPATIENT
Start: 2021-09-07 | End: 2021-09-08 | Stop reason: HOSPADM

## 2021-09-07 RX ORDER — CYCLOBENZAPRINE HCL 5 MG
5 TABLET ORAL 3 TIMES DAILY PRN
Status: DISCONTINUED | OUTPATIENT
Start: 2021-09-07 | End: 2021-09-08 | Stop reason: HOSPADM

## 2021-09-07 RX ORDER — AMOXICILLIN 250 MG
1 CAPSULE ORAL 2 TIMES DAILY
Status: DISCONTINUED | OUTPATIENT
Start: 2021-09-07 | End: 2021-09-08 | Stop reason: HOSPADM

## 2021-09-07 RX ORDER — SODIUM CHLORIDE 9 MG/ML
INJECTION, SOLUTION INTRAVENOUS CONTINUOUS
Status: DISCONTINUED | OUTPATIENT
Start: 2021-09-07 | End: 2021-09-08 | Stop reason: HOSPADM

## 2021-09-07 RX ORDER — MEPERIDINE HYDROCHLORIDE 25 MG/ML
25 INJECTION INTRAMUSCULAR; INTRAVENOUS; SUBCUTANEOUS EVERY 10 MIN PRN
Status: DISCONTINUED | OUTPATIENT
Start: 2021-09-07 | End: 2021-09-07 | Stop reason: HOSPADM

## 2021-09-07 RX ORDER — GABAPENTIN 300 MG/1
600 CAPSULE ORAL 3 TIMES DAILY
Status: DISCONTINUED | OUTPATIENT
Start: 2021-09-07 | End: 2021-09-08 | Stop reason: HOSPADM

## 2021-09-07 RX ORDER — CHOLECALCIFEROL (VITAMIN D3) 25 MCG
1000 TABLET ORAL DAILY
Status: DISCONTINUED | OUTPATIENT
Start: 2021-09-07 | End: 2021-09-08 | Stop reason: HOSPADM

## 2021-09-07 RX ORDER — IPRATROPIUM BROMIDE AND ALBUTEROL SULFATE 2.5; .5 MG/3ML; MG/3ML
3 SOLUTION RESPIRATORY (INHALATION) EVERY 4 HOURS PRN
Status: DISCONTINUED | OUTPATIENT
Start: 2021-09-07 | End: 2021-09-08 | Stop reason: HOSPADM

## 2021-09-07 RX ORDER — PANTOPRAZOLE SODIUM 40 MG/1
40 TABLET, DELAYED RELEASE ORAL DAILY
Status: DISCONTINUED | OUTPATIENT
Start: 2021-09-07 | End: 2021-09-08 | Stop reason: HOSPADM

## 2021-09-07 RX ORDER — CARVEDILOL 12.5 MG/1
12.5 TABLET ORAL 2 TIMES DAILY
Status: DISCONTINUED | OUTPATIENT
Start: 2021-09-07 | End: 2021-09-08 | Stop reason: HOSPADM

## 2021-09-07 RX ORDER — DULOXETIN HYDROCHLORIDE 30 MG/1
60 CAPSULE, DELAYED RELEASE ORAL DAILY
Status: DISCONTINUED | OUTPATIENT
Start: 2021-09-07 | End: 2021-09-08 | Stop reason: HOSPADM

## 2021-09-07 RX ORDER — NITROGLYCERIN 0.4 MG/1
0.4 TABLET SUBLINGUAL EVERY 5 MIN PRN
Status: DISCONTINUED | OUTPATIENT
Start: 2021-09-07 | End: 2021-09-08 | Stop reason: HOSPADM

## 2021-09-07 RX ORDER — PHENYLEPHRINE HYDROCHLORIDE 10 MG/ML
INJECTION INTRAVENOUS
Status: DISCONTINUED | OUTPATIENT
Start: 2021-09-07 | End: 2021-09-07

## 2021-09-07 RX ORDER — AMITRIPTYLINE HYDROCHLORIDE 25 MG/1
25 TABLET, FILM COATED ORAL NIGHTLY
Status: DISCONTINUED | OUTPATIENT
Start: 2021-09-07 | End: 2021-09-08 | Stop reason: HOSPADM

## 2021-09-07 RX ORDER — AMLODIPINE BESYLATE 10 MG/1
10 TABLET ORAL DAILY
Status: DISCONTINUED | OUTPATIENT
Start: 2021-09-07 | End: 2021-09-08 | Stop reason: HOSPADM

## 2021-09-07 RX ORDER — MORPHINE SULFATE 10 MG/ML
4 INJECTION INTRAMUSCULAR; INTRAVENOUS; SUBCUTANEOUS EVERY 5 MIN PRN
Status: DISCONTINUED | OUTPATIENT
Start: 2021-09-07 | End: 2021-09-07 | Stop reason: HOSPADM

## 2021-09-07 RX ORDER — FUROSEMIDE 20 MG/1
20 TABLET ORAL DAILY
Status: DISCONTINUED | OUTPATIENT
Start: 2021-09-07 | End: 2021-09-08 | Stop reason: HOSPADM

## 2021-09-07 RX ORDER — TOPIRAMATE 25 MG/1
25 TABLET ORAL DAILY
Status: DISCONTINUED | OUTPATIENT
Start: 2021-09-07 | End: 2021-09-08 | Stop reason: HOSPADM

## 2021-09-07 RX ORDER — MUPIROCIN 20 MG/G
OINTMENT TOPICAL 2 TIMES DAILY
Status: DISCONTINUED | OUTPATIENT
Start: 2021-09-07 | End: 2021-09-08 | Stop reason: HOSPADM

## 2021-09-07 RX ORDER — EPINEPHRINE 0.1 MG/ML
INJECTION INTRAVENOUS
Status: DISCONTINUED | OUTPATIENT
Start: 2021-09-07 | End: 2021-09-07 | Stop reason: HOSPADM

## 2021-09-07 RX ORDER — FENTANYL CITRATE 50 UG/ML
INJECTION, SOLUTION INTRAMUSCULAR; INTRAVENOUS
Status: DISCONTINUED | OUTPATIENT
Start: 2021-09-07 | End: 2021-09-07

## 2021-09-07 RX ORDER — ONDANSETRON 2 MG/ML
4 INJECTION INTRAMUSCULAR; INTRAVENOUS EVERY 12 HOURS PRN
Status: DISCONTINUED | OUTPATIENT
Start: 2021-09-07 | End: 2021-09-08 | Stop reason: HOSPADM

## 2021-09-07 RX ORDER — HYDROMORPHONE HYDROCHLORIDE 2 MG/ML
0.5 INJECTION, SOLUTION INTRAMUSCULAR; INTRAVENOUS; SUBCUTANEOUS EVERY 5 MIN PRN
Status: DISCONTINUED | OUTPATIENT
Start: 2021-09-07 | End: 2021-09-07 | Stop reason: HOSPADM

## 2021-09-07 RX ORDER — SODIUM CHLORIDE, SODIUM LACTATE, POTASSIUM CHLORIDE, CALCIUM CHLORIDE 600; 310; 30; 20 MG/100ML; MG/100ML; MG/100ML; MG/100ML
INJECTION, SOLUTION INTRAVENOUS CONTINUOUS
Status: DISCONTINUED | OUTPATIENT
Start: 2021-09-07 | End: 2021-09-07

## 2021-09-07 RX ORDER — SODIUM CHLORIDE, SODIUM LACTATE, POTASSIUM CHLORIDE, CALCIUM CHLORIDE 600; 310; 30; 20 MG/100ML; MG/100ML; MG/100ML; MG/100ML
INJECTION, SOLUTION INTRAVENOUS CONTINUOUS PRN
Status: DISCONTINUED | OUTPATIENT
Start: 2021-09-07 | End: 2021-09-07

## 2021-09-07 RX ORDER — CELECOXIB 100 MG/1
200 CAPSULE ORAL ONCE
Status: COMPLETED | OUTPATIENT
Start: 2021-09-07 | End: 2021-09-07

## 2021-09-07 RX ORDER — DIPHENHYDRAMINE HYDROCHLORIDE 50 MG/ML
25 INJECTION INTRAMUSCULAR; INTRAVENOUS EVERY 6 HOURS PRN
Status: DISCONTINUED | OUTPATIENT
Start: 2021-09-07 | End: 2021-09-07 | Stop reason: HOSPADM

## 2021-09-07 RX ORDER — OXYCODONE HYDROCHLORIDE 5 MG/1
5 TABLET ORAL EVERY 4 HOURS PRN
Status: DISCONTINUED | OUTPATIENT
Start: 2021-09-07 | End: 2021-09-08 | Stop reason: HOSPADM

## 2021-09-07 RX ORDER — QUETIAPINE FUMARATE 25 MG/1
25 TABLET, FILM COATED ORAL DAILY
Status: DISCONTINUED | OUTPATIENT
Start: 2021-09-07 | End: 2021-09-08 | Stop reason: HOSPADM

## 2021-09-07 RX ORDER — DEXAMETHASONE SODIUM PHOSPHATE 4 MG/ML
INJECTION, SOLUTION INTRA-ARTICULAR; INTRALESIONAL; INTRAMUSCULAR; INTRAVENOUS; SOFT TISSUE
Status: DISCONTINUED | OUTPATIENT
Start: 2021-09-07 | End: 2021-09-07

## 2021-09-07 RX ORDER — FERROUS GLUCONATE 324(38)MG
324 TABLET ORAL
Status: DISCONTINUED | OUTPATIENT
Start: 2021-09-08 | End: 2021-09-08 | Stop reason: HOSPADM

## 2021-09-07 RX ORDER — LISINOPRIL 10 MG/1
10 TABLET ORAL DAILY
Status: DISCONTINUED | OUTPATIENT
Start: 2021-09-07 | End: 2021-09-08 | Stop reason: HOSPADM

## 2021-09-07 RX ORDER — ONDANSETRON 2 MG/ML
INJECTION INTRAMUSCULAR; INTRAVENOUS
Status: DISCONTINUED | OUTPATIENT
Start: 2021-09-07 | End: 2021-09-07

## 2021-09-07 RX ORDER — OXYBUTYNIN CHLORIDE 5 MG/1
5 TABLET, EXTENDED RELEASE ORAL DAILY
Status: DISCONTINUED | OUTPATIENT
Start: 2021-09-07 | End: 2021-09-08 | Stop reason: HOSPADM

## 2021-09-07 RX ORDER — CLINDAMYCIN PHOSPHATE 900 MG/50ML
INJECTION, SOLUTION INTRAVENOUS
Status: DISCONTINUED | OUTPATIENT
Start: 2021-09-07 | End: 2021-09-07

## 2021-09-07 RX ORDER — ACETAMINOPHEN 325 MG/1
650 TABLET ORAL EVERY 4 HOURS PRN
Status: DISCONTINUED | OUTPATIENT
Start: 2021-09-07 | End: 2021-09-08 | Stop reason: HOSPADM

## 2021-09-07 RX ORDER — MONTELUKAST SODIUM 10 MG/1
10 TABLET ORAL DAILY
Status: DISCONTINUED | OUTPATIENT
Start: 2021-09-07 | End: 2021-09-08 | Stop reason: HOSPADM

## 2021-09-07 RX ORDER — LIDOCAINE HYDROCHLORIDE 20 MG/ML
INJECTION, SOLUTION EPIDURAL; INFILTRATION; INTRACAUDAL; PERINEURAL
Status: DISCONTINUED | OUTPATIENT
Start: 2021-09-07 | End: 2021-09-07

## 2021-09-07 RX ORDER — ISOSORBIDE DINITRATE 10 MG/1
10 TABLET ORAL 2 TIMES DAILY
Status: DISCONTINUED | OUTPATIENT
Start: 2021-09-07 | End: 2021-09-08 | Stop reason: HOSPADM

## 2021-09-07 RX ORDER — CLINDAMYCIN PHOSPHATE 600 MG/50ML
600 INJECTION, SOLUTION INTRAVENOUS
Status: COMPLETED | OUTPATIENT
Start: 2021-09-07 | End: 2021-09-08

## 2021-09-07 RX ORDER — EPHEDRINE SULFATE 50 MG/ML
INJECTION, SOLUTION INTRAVENOUS
Status: DISCONTINUED | OUTPATIENT
Start: 2021-09-07 | End: 2021-09-07

## 2021-09-07 RX ORDER — ROCURONIUM BROMIDE 10 MG/ML
INJECTION, SOLUTION INTRAVENOUS
Status: DISCONTINUED | OUTPATIENT
Start: 2021-09-07 | End: 2021-09-07

## 2021-09-07 RX ORDER — SODIUM CHLORIDE 0.9 % (FLUSH) 0.9 %
10 SYRINGE (ML) INJECTION
Status: DISCONTINUED | OUTPATIENT
Start: 2021-09-07 | End: 2021-09-08 | Stop reason: HOSPADM

## 2021-09-07 RX ORDER — LIDOCAINE HYDROCHLORIDE 10 MG/ML
1 INJECTION, SOLUTION EPIDURAL; INFILTRATION; INTRACAUDAL; PERINEURAL ONCE
Status: DISCONTINUED | OUTPATIENT
Start: 2021-09-07 | End: 2021-09-07 | Stop reason: HOSPADM

## 2021-09-07 RX ORDER — PROPOFOL 10 MG/ML
VIAL (ML) INTRAVENOUS
Status: DISCONTINUED | OUTPATIENT
Start: 2021-09-07 | End: 2021-09-07

## 2021-09-07 RX ORDER — GABAPENTIN 300 MG/1
600 CAPSULE ORAL ONCE
Status: COMPLETED | OUTPATIENT
Start: 2021-09-07 | End: 2021-09-07

## 2021-09-07 RX ORDER — ATORVASTATIN CALCIUM 20 MG/1
20 TABLET, FILM COATED ORAL DAILY
Status: DISCONTINUED | OUTPATIENT
Start: 2021-09-07 | End: 2021-09-08 | Stop reason: HOSPADM

## 2021-09-07 RX ORDER — ONDANSETRON 2 MG/ML
4 INJECTION INTRAMUSCULAR; INTRAVENOUS DAILY PRN
Status: DISCONTINUED | OUTPATIENT
Start: 2021-09-07 | End: 2021-09-07 | Stop reason: HOSPADM

## 2021-09-07 RX ORDER — MYCOPHENOLATE MOFETIL 250 MG/1
500 CAPSULE ORAL 2 TIMES DAILY
Status: DISCONTINUED | OUTPATIENT
Start: 2021-09-07 | End: 2021-09-08 | Stop reason: HOSPADM

## 2021-09-07 RX ADMIN — TRANEXAMIC ACID 1000 MG: 100 INJECTION, SOLUTION INTRAVENOUS at 07:09

## 2021-09-07 RX ADMIN — DEXAMETHASONE SODIUM PHOSPHATE 10 MG: 4 INJECTION, SOLUTION INTRA-ARTICULAR; INTRALESIONAL; INTRAMUSCULAR; INTRAVENOUS; SOFT TISSUE at 07:09

## 2021-09-07 RX ADMIN — EPHEDRINE SULFATE 25 MG: 50 INJECTION INTRAVENOUS at 08:09

## 2021-09-07 RX ADMIN — FENTANYL CITRATE 100 MCG: 50 INJECTION INTRAMUSCULAR; INTRAVENOUS at 07:09

## 2021-09-07 RX ADMIN — EPHEDRINE SULFATE 15 MG: 50 INJECTION INTRAVENOUS at 09:09

## 2021-09-07 RX ADMIN — ONDANSETRON 4 MG: 2 INJECTION INTRAMUSCULAR; INTRAVENOUS at 08:09

## 2021-09-07 RX ADMIN — GABAPENTIN 600 MG: 300 CAPSULE ORAL at 10:09

## 2021-09-07 RX ADMIN — SODIUM CHLORIDE: 9 INJECTION, SOLUTION INTRAVENOUS at 06:09

## 2021-09-07 RX ADMIN — MIDAZOLAM 2 MG: 1 INJECTION INTRAMUSCULAR; INTRAVENOUS at 07:09

## 2021-09-07 RX ADMIN — CLINDAMYCIN PHOSPHATE 600 MG: 600 INJECTION, SOLUTION INTRAVENOUS at 01:09

## 2021-09-07 RX ADMIN — HYDROMORPHONE HYDROCHLORIDE 0.5 MG: 2 INJECTION, SOLUTION INTRAMUSCULAR; INTRAVENOUS; SUBCUTANEOUS at 09:09

## 2021-09-07 RX ADMIN — CLINDAMYCIN PHOSPHATE 600 MG: 600 INJECTION, SOLUTION INTRAVENOUS at 10:09

## 2021-09-07 RX ADMIN — CELECOXIB 200 MG: 100 CAPSULE ORAL at 06:09

## 2021-09-07 RX ADMIN — SODIUM CHLORIDE, POTASSIUM CHLORIDE, SODIUM LACTATE AND CALCIUM CHLORIDE: 600; 310; 30; 20 INJECTION, SOLUTION INTRAVENOUS at 07:09

## 2021-09-07 RX ADMIN — EPHEDRINE SULFATE 10 MG: 50 INJECTION INTRAVENOUS at 08:09

## 2021-09-07 RX ADMIN — LISINOPRIL 10 MG: 10 TABLET ORAL at 02:09

## 2021-09-07 RX ADMIN — Medication 1000 UNITS: at 02:09

## 2021-09-07 RX ADMIN — DOCUSATE SODIUM 100 MG: 100 CAPSULE, LIQUID FILLED ORAL at 10:09

## 2021-09-07 RX ADMIN — PANTOPRAZOLE SODIUM 40 MG: 40 TABLET, DELAYED RELEASE ORAL at 02:09

## 2021-09-07 RX ADMIN — AMITRIPTYLINE HYDROCHLORIDE 25 MG: 25 TABLET, FILM COATED ORAL at 10:09

## 2021-09-07 RX ADMIN — OXYBUTYNIN CHLORIDE 5 MG: 5 TABLET, EXTENDED RELEASE ORAL at 02:09

## 2021-09-07 RX ADMIN — FUROSEMIDE 20 MG: 20 TABLET ORAL at 02:09

## 2021-09-07 RX ADMIN — CLINDAMYCIN IN 5 PERCENT DEXTROSE 900 MG: 18 INJECTION, SOLUTION INTRAVENOUS at 07:09

## 2021-09-07 RX ADMIN — DULOXETINE 60 MG: 30 CAPSULE, DELAYED RELEASE ORAL at 02:09

## 2021-09-07 RX ADMIN — OXYCODONE HYDROCHLORIDE 10 MG: 10 TABLET, FILM COATED, EXTENDED RELEASE ORAL at 06:09

## 2021-09-07 RX ADMIN — ATORVASTATIN CALCIUM 20 MG: 20 TABLET, FILM COATED ORAL at 02:09

## 2021-09-07 RX ADMIN — PHENYLEPHRINE HYDROCHLORIDE 100 MCG: 10 INJECTION INTRAVENOUS at 08:09

## 2021-09-07 RX ADMIN — OXYCODONE HYDROCHLORIDE 10 MG: 5 TABLET ORAL at 01:09

## 2021-09-07 RX ADMIN — MUPIROCIN: 20 OINTMENT TOPICAL at 02:09

## 2021-09-07 RX ADMIN — LIDOCAINE HYDROCHLORIDE 80 MG: 20 INJECTION, SOLUTION INTRAVENOUS at 07:09

## 2021-09-07 RX ADMIN — MUPIROCIN: 20 OINTMENT TOPICAL at 10:09

## 2021-09-07 RX ADMIN — ROCURONIUM BROMIDE 40 MG: 10 INJECTION INTRAVENOUS at 07:09

## 2021-09-07 RX ADMIN — ISOSORBIDE DINITRATE 10 MG: 10 TABLET ORAL at 10:09

## 2021-09-07 RX ADMIN — TOPIRAMATE 25 MG: 25 TABLET, FILM COATED ORAL at 02:09

## 2021-09-07 RX ADMIN — PROPOFOL 130 MG: 10 INJECTION, EMULSION INTRAVENOUS at 07:09

## 2021-09-07 RX ADMIN — SENNOSIDES AND DOCUSATE SODIUM 1 TABLET: 8.6; 5 TABLET ORAL at 10:09

## 2021-09-07 RX ADMIN — QUETIAPINE 25 MG: 25 TABLET ORAL at 02:09

## 2021-09-07 RX ADMIN — ONDANSETRON 4 MG: 2 INJECTION INTRAMUSCULAR; INTRAVENOUS at 09:09

## 2021-09-07 RX ADMIN — ROCURONIUM BROMIDE 10 MG: 10 INJECTION INTRAVENOUS at 08:09

## 2021-09-07 RX ADMIN — MYCOPHENOLATE MOFETIL 500 MG: 250 CAPSULE ORAL at 10:09

## 2021-09-07 RX ADMIN — AMLODIPINE BESYLATE 10 MG: 10 TABLET ORAL at 02:09

## 2021-09-07 RX ADMIN — DEXAMETHASONE SODIUM PHOSPHATE 10 MG: 4 INJECTION, SOLUTION INTRA-ARTICULAR; INTRALESIONAL; INTRAMUSCULAR; INTRAVENOUS; SOFT TISSUE at 09:09

## 2021-09-07 RX ADMIN — CARVEDILOL 12.5 MG: 12.5 TABLET, FILM COATED ORAL at 10:09

## 2021-09-07 RX ADMIN — GABAPENTIN 600 MG: 300 CAPSULE ORAL at 06:09

## 2021-09-07 RX ADMIN — GABAPENTIN 600 MG: 300 CAPSULE ORAL at 02:09

## 2021-09-07 RX ADMIN — ISOSORBIDE DINITRATE 10 MG: 10 TABLET ORAL at 02:09

## 2021-09-07 RX ADMIN — CARVEDILOL 12.5 MG: 12.5 TABLET, FILM COATED ORAL at 02:09

## 2021-09-08 VITALS
RESPIRATION RATE: 18 BRPM | OXYGEN SATURATION: 100 % | DIASTOLIC BLOOD PRESSURE: 56 MMHG | SYSTOLIC BLOOD PRESSURE: 122 MMHG | TEMPERATURE: 99 F | WEIGHT: 139 LBS | BODY MASS INDEX: 24.63 KG/M2 | HEIGHT: 63 IN | HEART RATE: 103 BPM

## 2021-09-08 LAB
ALBUMIN SERPL BCP-MCNC: 3.1 G/DL (ref 3.5–5)
ALBUMIN/GLOB SERPL: 1 {RATIO}
ALP SERPL-CCNC: 73 U/L (ref 55–142)
ALT SERPL W P-5'-P-CCNC: 17 U/L (ref 13–56)
ANION GAP SERPL CALCULATED.3IONS-SCNC: 12 MMOL/L (ref 7–16)
AST SERPL W P-5'-P-CCNC: 19 U/L (ref 15–37)
BASOPHILS # BLD AUTO: 0.01 K/UL (ref 0–0.2)
BASOPHILS NFR BLD AUTO: 0.1 % (ref 0–1)
BILIRUB SERPL-MCNC: 0.3 MG/DL (ref 0–1.2)
BUN SERPL-MCNC: 9 MG/DL (ref 7–18)
BUN/CREAT SERPL: 16 (ref 6–20)
CALCIUM SERPL-MCNC: 8.6 MG/DL (ref 8.5–10.1)
CHLORIDE SERPL-SCNC: 111 MMOL/L (ref 98–107)
CO2 SERPL-SCNC: 26 MMOL/L (ref 21–32)
CREAT SERPL-MCNC: 0.56 MG/DL (ref 0.55–1.02)
DIFFERENTIAL METHOD BLD: ABNORMAL
EOSINOPHIL # BLD AUTO: 0 K/UL (ref 0–0.5)
EOSINOPHIL NFR BLD AUTO: 0 % (ref 1–4)
ERYTHROCYTE [DISTWIDTH] IN BLOOD BY AUTOMATED COUNT: 14.1 % (ref 11.5–14.5)
GLOBULIN SER-MCNC: 3 G/DL (ref 2–4)
GLUCOSE SERPL-MCNC: 109 MG/DL (ref 74–106)
HCT VFR BLD AUTO: 34.6 % (ref 38–47)
HGB BLD-MCNC: 10.7 G/DL (ref 12–16)
IMM GRANULOCYTES # BLD AUTO: 0.05 K/UL (ref 0–0.04)
IMM GRANULOCYTES NFR BLD: 0.5 % (ref 0–0.4)
LYMPHOCYTES # BLD AUTO: 0.74 K/UL (ref 1–4.8)
LYMPHOCYTES NFR BLD AUTO: 6.8 % (ref 27–41)
MCH RBC QN AUTO: 25.1 PG (ref 27–31)
MCHC RBC AUTO-ENTMCNC: 30.9 G/DL (ref 32–36)
MCV RBC AUTO: 81 FL (ref 80–96)
MONOCYTES # BLD AUTO: 0.8 K/UL (ref 0–0.8)
MONOCYTES NFR BLD AUTO: 7.3 % (ref 2–6)
MPC BLD CALC-MCNC: 9.7 FL (ref 9.4–12.4)
NEUTROPHILS # BLD AUTO: 9.36 K/UL (ref 1.8–7.7)
NEUTROPHILS NFR BLD AUTO: 85.3 % (ref 53–65)
NRBC # BLD AUTO: 0 X10E3/UL
NRBC, AUTO (.00): 0 %
PLATELET # BLD AUTO: 305 K/UL (ref 150–400)
POTASSIUM SERPL-SCNC: 3.5 MMOL/L (ref 3.5–5.1)
PROT SERPL-MCNC: 6.1 G/DL (ref 6.4–8.2)
RBC # BLD AUTO: 4.27 M/UL (ref 4.2–5.4)
SODIUM SERPL-SCNC: 145 MMOL/L (ref 136–145)
WBC # BLD AUTO: 10.96 K/UL (ref 4.5–11)

## 2021-09-08 PROCEDURE — 94761 N-INVAS EAR/PLS OXIMETRY MLT: CPT

## 2021-09-08 PROCEDURE — 36415 COLL VENOUS BLD VENIPUNCTURE: CPT | Performed by: INTERNAL MEDICINE

## 2021-09-08 PROCEDURE — 97165 OT EVAL LOW COMPLEX 30 MIN: CPT

## 2021-09-08 PROCEDURE — 63600175 PHARM REV CODE 636 W HCPCS: Performed by: NURSE PRACTITIONER

## 2021-09-08 PROCEDURE — 99232 PR SUBSEQUENT HOSPITAL CARE,LEVL II: ICD-10-PCS | Mod: ,,, | Performed by: INTERNAL MEDICINE

## 2021-09-08 PROCEDURE — 25000003 PHARM REV CODE 250: Performed by: ORTHOPAEDIC SURGERY

## 2021-09-08 PROCEDURE — 85025 COMPLETE CBC W/AUTO DIFF WBC: CPT | Performed by: NURSE PRACTITIONER

## 2021-09-08 PROCEDURE — 99232 SBSQ HOSP IP/OBS MODERATE 35: CPT | Mod: ,,, | Performed by: INTERNAL MEDICINE

## 2021-09-08 PROCEDURE — 80053 COMPREHEN METABOLIC PANEL: CPT | Performed by: INTERNAL MEDICINE

## 2021-09-08 RX ADMIN — DULOXETINE 60 MG: 30 CAPSULE, DELAYED RELEASE ORAL at 08:09

## 2021-09-08 RX ADMIN — ATORVASTATIN CALCIUM 20 MG: 20 TABLET, FILM COATED ORAL at 08:09

## 2021-09-08 RX ADMIN — PANTOPRAZOLE SODIUM 40 MG: 40 TABLET, DELAYED RELEASE ORAL at 08:09

## 2021-09-08 RX ADMIN — SENNOSIDES AND DOCUSATE SODIUM 1 TABLET: 8.6; 5 TABLET ORAL at 08:09

## 2021-09-08 RX ADMIN — AMLODIPINE BESYLATE 10 MG: 10 TABLET ORAL at 08:09

## 2021-09-08 RX ADMIN — OXYBUTYNIN CHLORIDE 5 MG: 5 TABLET, EXTENDED RELEASE ORAL at 08:09

## 2021-09-08 RX ADMIN — FUROSEMIDE 20 MG: 20 TABLET ORAL at 08:09

## 2021-09-08 RX ADMIN — GABAPENTIN 600 MG: 300 CAPSULE ORAL at 08:09

## 2021-09-08 RX ADMIN — MUPIROCIN: 20 OINTMENT TOPICAL at 08:09

## 2021-09-08 RX ADMIN — DOCUSATE SODIUM 100 MG: 100 CAPSULE, LIQUID FILLED ORAL at 08:09

## 2021-09-08 RX ADMIN — ISOSORBIDE DINITRATE 10 MG: 10 TABLET ORAL at 08:09

## 2021-09-08 RX ADMIN — MYCOPHENOLATE MOFETIL 500 MG: 250 CAPSULE ORAL at 08:09

## 2021-09-08 RX ADMIN — LISINOPRIL 10 MG: 10 TABLET ORAL at 08:09

## 2021-09-08 RX ADMIN — POTASSIUM BICARBONATE 20 MEQ: 782 TABLET, EFFERVESCENT ORAL at 08:09

## 2021-09-08 RX ADMIN — CLINDAMYCIN PHOSPHATE 600 MG: 600 INJECTION, SOLUTION INTRAVENOUS at 03:09

## 2021-09-08 RX ADMIN — LEVOTHYROXINE SODIUM 75 MCG: 75 TABLET ORAL at 06:09

## 2021-09-08 RX ADMIN — FERROUS GLUCONATE 324 MG: 324 TABLET ORAL at 08:09

## 2021-09-08 RX ADMIN — TOPIRAMATE 25 MG: 25 TABLET, FILM COATED ORAL at 08:09

## 2021-09-08 RX ADMIN — QUETIAPINE 25 MG: 25 TABLET ORAL at 08:09

## 2021-09-08 RX ADMIN — MONTELUKAST 10 MG: 10 TABLET, FILM COATED ORAL at 08:09

## 2021-09-08 RX ADMIN — Medication 1000 UNITS: at 08:09

## 2021-09-08 RX ADMIN — OXYCODONE HYDROCHLORIDE 10 MG: 5 TABLET ORAL at 04:09

## 2021-09-08 RX ADMIN — CARVEDILOL 12.5 MG: 12.5 TABLET, FILM COATED ORAL at 08:09

## 2021-09-09 ENCOUNTER — OFFICE VISIT (OUTPATIENT)
Dept: PAIN MEDICINE | Facility: CLINIC | Age: 71
End: 2021-09-09
Payer: COMMERCIAL

## 2021-09-09 VITALS
DIASTOLIC BLOOD PRESSURE: 69 MMHG | HEIGHT: 63 IN | HEART RATE: 102 BPM | WEIGHT: 139 LBS | RESPIRATION RATE: 20 BRPM | SYSTOLIC BLOOD PRESSURE: 138 MMHG | BODY MASS INDEX: 24.63 KG/M2

## 2021-09-09 DIAGNOSIS — M47.817 LUMBOSACRAL SPONDYLOSIS WITHOUT MYELOPATHY: Primary | Chronic | ICD-10-CM

## 2021-09-09 DIAGNOSIS — Z79.899 ENCOUNTER FOR LONG-TERM (CURRENT) USE OF OTHER MEDICATIONS: ICD-10-CM

## 2021-09-09 DIAGNOSIS — M32.9 LUPUS: Chronic | ICD-10-CM

## 2021-09-09 LAB
CTP QC/QA: YES
POC (AMP) AMPHETAMINE: NEGATIVE
POC (BAR) BARBITURATES: NEGATIVE
POC (BUP) BUPRENORPHINE: NEGATIVE
POC (BZO) BENZODIAZEPINES: NEGATIVE
POC (COC) COCAINE: NEGATIVE
POC (MDMA) METHYLENEDIOXYMETHAMPHETAMINE 3,4: NEGATIVE
POC (MET) METHAMPHETAMINE: NEGATIVE
POC (MOP) OPIATES: ABNORMAL
POC (MTD) METHADONE: NEGATIVE
POC (OXY) OXYCODONE: NEGATIVE
POC (PCP) PHENCYCLIDINE: NEGATIVE
POC (TCA) TRICYCLIC ANTIDEPRESSANTS: NEGATIVE
POC TEMPERATURE (URINE): 90
POC THC: NEGATIVE

## 2021-09-09 PROCEDURE — 99214 OFFICE O/P EST MOD 30 MIN: CPT | Mod: S$PBB,,, | Performed by: PHYSICIAN ASSISTANT

## 2021-09-09 PROCEDURE — 80305 DRUG TEST PRSMV DIR OPT OBS: CPT | Mod: PBBFAC | Performed by: PHYSICIAN ASSISTANT

## 2021-09-09 PROCEDURE — 99215 OFFICE O/P EST HI 40 MIN: CPT | Mod: PBBFAC | Performed by: PHYSICIAN ASSISTANT

## 2021-09-09 PROCEDURE — 99214 PR OFFICE/OUTPT VISIT, EST, LEVL IV, 30-39 MIN: ICD-10-PCS | Mod: S$PBB,,, | Performed by: PHYSICIAN ASSISTANT

## 2021-09-09 RX ORDER — DULOXETIN HYDROCHLORIDE 30 MG/1
30 CAPSULE, DELAYED RELEASE ORAL 2 TIMES DAILY
Qty: 60 CAPSULE | Refills: 2 | Status: SHIPPED | OUTPATIENT
Start: 2021-09-09 | End: 2021-12-08

## 2021-09-09 RX ORDER — HYDROCODONE BITARTRATE AND ACETAMINOPHEN 10; 325 MG/1; MG/1
1 TABLET ORAL EVERY 6 HOURS
Qty: 120 TABLET | Refills: 0 | Status: SHIPPED | OUTPATIENT
Start: 2021-09-11 | End: 2021-10-11

## 2021-09-09 RX ORDER — AMITRIPTYLINE HYDROCHLORIDE 25 MG/1
25 TABLET, FILM COATED ORAL NIGHTLY
Qty: 30 TABLET | Refills: 2 | Status: SHIPPED | OUTPATIENT
Start: 2021-09-09 | End: 2021-12-08 | Stop reason: SDUPTHER

## 2021-09-09 RX ORDER — HYDROCODONE BITARTRATE AND ACETAMINOPHEN 10; 325 MG/1; MG/1
1 TABLET ORAL EVERY 6 HOURS
Qty: 120 TABLET | Refills: 0 | Status: SHIPPED | OUTPATIENT
Start: 2021-10-11 | End: 2021-11-10

## 2021-09-09 RX ORDER — HYDROCODONE BITARTRATE AND ACETAMINOPHEN 10; 325 MG/1; MG/1
1 TABLET ORAL EVERY 6 HOURS
Qty: 120 TABLET | Refills: 0 | Status: SHIPPED | OUTPATIENT
Start: 2021-11-10 | End: 2021-12-08 | Stop reason: SDUPTHER

## 2021-09-09 RX ORDER — GABAPENTIN 600 MG/1
600 TABLET ORAL EVERY 8 HOURS
Qty: 90 TABLET | Refills: 2 | Status: SHIPPED | OUTPATIENT
Start: 2021-09-09 | End: 2021-12-08 | Stop reason: SDUPTHER

## 2021-09-20 DIAGNOSIS — M54.12 RADICULOPATHY, CERVICAL REGION: Primary | ICD-10-CM

## 2021-09-22 ENCOUNTER — HOSPITAL ENCOUNTER (OUTPATIENT)
Dept: RADIOLOGY | Facility: HOSPITAL | Age: 71
Discharge: HOME | End: 2021-09-22
Attending: ORTHOPAEDIC SURGERY
Payer: COMMERCIAL

## 2021-09-22 ENCOUNTER — OFFICE VISIT (OUTPATIENT)
Dept: SPINE | Facility: CLINIC | Age: 71
End: 2021-09-22
Payer: COMMERCIAL

## 2021-09-22 DIAGNOSIS — M48.062 LUMBAR STENOSIS WITH NEUROGENIC CLAUDICATION: ICD-10-CM

## 2021-09-22 DIAGNOSIS — G95.9 CERVICAL MYELOPATHY: ICD-10-CM

## 2021-09-22 DIAGNOSIS — M54.12 RADICULOPATHY, CERVICAL REGION: ICD-10-CM

## 2021-09-22 DIAGNOSIS — M43.16 SPONDYLOLISTHESIS, LUMBAR REGION: ICD-10-CM

## 2021-09-22 DIAGNOSIS — M51.36 DDD (DEGENERATIVE DISC DISEASE), LUMBAR: Primary | ICD-10-CM

## 2021-09-22 DIAGNOSIS — M50.30 DDD (DEGENERATIVE DISC DISEASE), CERVICAL: ICD-10-CM

## 2021-09-22 PROCEDURE — 99214 OFFICE O/P EST MOD 30 MIN: CPT | Mod: PBBFAC | Performed by: ORTHOPAEDIC SURGERY

## 2021-09-22 PROCEDURE — 99214 PR OFFICE/OUTPT VISIT, EST, LEVL IV, 30-39 MIN: ICD-10-PCS | Mod: 24,S$PBB,, | Performed by: ORTHOPAEDIC SURGERY

## 2021-09-22 PROCEDURE — 99214 OFFICE O/P EST MOD 30 MIN: CPT | Mod: 24,S$PBB,, | Performed by: ORTHOPAEDIC SURGERY

## 2021-09-22 PROCEDURE — 72040 X-RAY EXAM NECK SPINE 2-3 VW: CPT | Mod: TC

## 2021-09-22 PROCEDURE — 72040 XR CERVICAL SPINE AP LATERAL: ICD-10-PCS | Mod: 26,,, | Performed by: ORTHOPAEDIC SURGERY

## 2021-09-22 PROCEDURE — 72040 X-RAY EXAM NECK SPINE 2-3 VW: CPT | Mod: 26,,, | Performed by: ORTHOPAEDIC SURGERY

## 2021-10-01 ENCOUNTER — TELEPHONE (OUTPATIENT)
Dept: SPINE | Facility: CLINIC | Age: 71
End: 2021-10-01

## 2021-11-12 DIAGNOSIS — M47.817 LUMBOSACRAL SPONDYLOSIS WITHOUT MYELOPATHY: Primary | ICD-10-CM

## 2021-11-12 RX ORDER — SODIUM CHLORIDE 9 MG/ML
INJECTION, SOLUTION INTRAVENOUS CONTINUOUS
Status: CANCELLED | OUTPATIENT
Start: 2021-11-12

## 2021-11-12 RX ORDER — MUPIROCIN 20 MG/G
OINTMENT TOPICAL
Status: CANCELLED | OUTPATIENT
Start: 2021-11-12

## 2021-11-12 RX ORDER — CEFAZOLIN SODIUM 2 G/50ML
2 SOLUTION INTRAVENOUS
Status: CANCELLED | OUTPATIENT
Start: 2021-11-12

## 2021-11-24 ENCOUNTER — TELEPHONE (OUTPATIENT)
Dept: SPINE | Facility: CLINIC | Age: 71
End: 2021-11-24
Payer: COMMERCIAL

## 2021-12-01 ENCOUNTER — OFFICE VISIT (OUTPATIENT)
Dept: SPINE | Facility: CLINIC | Age: 71
DRG: 455 | End: 2021-12-01
Payer: COMMERCIAL

## 2021-12-01 DIAGNOSIS — M48.062 LUMBAR STENOSIS WITH NEUROGENIC CLAUDICATION: ICD-10-CM

## 2021-12-01 DIAGNOSIS — M51.36 DDD (DEGENERATIVE DISC DISEASE), LUMBAR: Primary | ICD-10-CM

## 2021-12-01 DIAGNOSIS — M43.16 SPONDYLOLISTHESIS, LUMBAR REGION: ICD-10-CM

## 2021-12-01 DIAGNOSIS — M54.16 LUMBAR RADICULOPATHY: ICD-10-CM

## 2021-12-01 PROCEDURE — 99214 OFFICE O/P EST MOD 30 MIN: CPT | Mod: PBBFAC | Performed by: ORTHOPAEDIC SURGERY

## 2021-12-01 PROCEDURE — 99215 PR OFFICE/OUTPT VISIT, EST, LEVL V, 40-54 MIN: ICD-10-PCS | Mod: 57,S$PBB,, | Performed by: ORTHOPAEDIC SURGERY

## 2021-12-01 PROCEDURE — 99215 OFFICE O/P EST HI 40 MIN: CPT | Mod: 57,S$PBB,, | Performed by: ORTHOPAEDIC SURGERY

## 2021-12-01 PROCEDURE — 86923 COMPATIBILITY TEST ELECTRIC: CPT | Performed by: FAMILY MEDICINE

## 2021-12-01 RX ORDER — CYCLOBENZAPRINE HCL 5 MG
5 TABLET ORAL 3 TIMES DAILY PRN
Qty: 45 TABLET | Refills: 0 | Status: SHIPPED | OUTPATIENT
Start: 2021-12-01 | End: 2021-12-11

## 2021-12-01 RX ORDER — MIRABEGRON 50 MG/1
TABLET, FILM COATED, EXTENDED RELEASE ORAL
COMMUNITY
Start: 2021-11-10

## 2021-12-01 RX ORDER — CYCLOBENZAPRINE HCL 5 MG
5 TABLET ORAL 3 TIMES DAILY PRN
Qty: 45 TABLET | Refills: 0 | Status: CANCELLED | OUTPATIENT
Start: 2021-12-01 | End: 2021-12-11

## 2021-12-01 RX ORDER — PROMETHAZINE HYDROCHLORIDE 25 MG/1
25 TABLET ORAL EVERY 6 HOURS PRN
Qty: 45 TABLET | Refills: 0 | Status: CANCELLED | OUTPATIENT
Start: 2021-12-01

## 2021-12-01 RX ORDER — OLMESARTAN MEDOXOMIL 40 MG/1
40 TABLET ORAL DAILY
COMMUNITY
Start: 2021-11-30

## 2021-12-01 RX ORDER — LANSOPRAZOLE 30 MG/1
CAPSULE, DELAYED RELEASE ORAL
COMMUNITY
Start: 2021-10-09

## 2021-12-01 RX ORDER — CARVEDILOL 25 MG/1
25 TABLET ORAL 2 TIMES DAILY
COMMUNITY
Start: 2021-11-30

## 2021-12-01 RX ORDER — TIZANIDINE 4 MG/1
TABLET ORAL
COMMUNITY
Start: 2021-09-11 | End: 2021-12-28

## 2021-12-01 RX ORDER — PREDNISONE 10 MG/1
TABLET ORAL
COMMUNITY
Start: 2021-07-22 | End: 2021-12-28

## 2021-12-01 RX ORDER — OXYCODONE AND ACETAMINOPHEN 5; 325 MG/1; MG/1
1 TABLET ORAL EVERY 4 HOURS PRN
Qty: 45 TABLET | Refills: 0 | Status: SHIPPED | OUTPATIENT
Start: 2021-12-01

## 2021-12-01 RX ORDER — DULOXETIN HYDROCHLORIDE 60 MG/1
60 CAPSULE, DELAYED RELEASE ORAL DAILY
COMMUNITY
Start: 2021-11-10 | End: 2021-12-08

## 2021-12-01 RX ORDER — PROMETHAZINE HYDROCHLORIDE 25 MG/1
25 TABLET ORAL EVERY 6 HOURS PRN
Qty: 45 TABLET | Refills: 0 | Status: SHIPPED | OUTPATIENT
Start: 2021-12-01

## 2021-12-01 RX ORDER — POTASSIUM CHLORIDE 750 MG/1
TABLET, EXTENDED RELEASE ORAL
COMMUNITY
Start: 2021-09-02 | End: 2021-12-28 | Stop reason: SDUPTHER

## 2021-12-01 RX ORDER — ISOSORBIDE MONONITRATE 30 MG/1
30 TABLET, EXTENDED RELEASE ORAL DAILY
COMMUNITY
Start: 2021-11-05

## 2021-12-01 RX ORDER — OXYCODONE AND ACETAMINOPHEN 5; 325 MG/1; MG/1
1 TABLET ORAL EVERY 4 HOURS PRN
Qty: 45 TABLET | Refills: 0 | Status: CANCELLED | OUTPATIENT
Start: 2021-12-01

## 2021-12-02 ENCOUNTER — HOSPITAL ENCOUNTER (INPATIENT)
Facility: HOSPITAL | Age: 71
LOS: 5 days | Discharge: HOME | DRG: 455 | End: 2021-12-07
Attending: ORTHOPAEDIC SURGERY | Admitting: ORTHOPAEDIC SURGERY
Payer: COMMERCIAL

## 2021-12-02 ENCOUNTER — ANESTHESIA (OUTPATIENT)
Dept: SURGERY | Facility: HOSPITAL | Age: 71
DRG: 455 | End: 2021-12-02
Payer: COMMERCIAL

## 2021-12-02 ENCOUNTER — ANESTHESIA EVENT (OUTPATIENT)
Dept: SURGERY | Facility: HOSPITAL | Age: 71
DRG: 455 | End: 2021-12-02
Payer: COMMERCIAL

## 2021-12-02 DIAGNOSIS — E78.5 HYPERLIPIDEMIA, UNSPECIFIED HYPERLIPIDEMIA TYPE: Chronic | ICD-10-CM

## 2021-12-02 DIAGNOSIS — M43.16 SPONDYLOLISTHESIS OF LUMBAR REGION: ICD-10-CM

## 2021-12-02 DIAGNOSIS — M47.817 LUMBOSACRAL SPONDYLOSIS WITHOUT MYELOPATHY: ICD-10-CM

## 2021-12-02 DIAGNOSIS — J44.9 CHRONIC OBSTRUCTIVE PULMONARY DISEASE, UNSPECIFIED COPD TYPE: Chronic | ICD-10-CM

## 2021-12-02 DIAGNOSIS — M32.9 LUPUS: Chronic | ICD-10-CM

## 2021-12-02 DIAGNOSIS — I10 HYPERTENSION, UNSPECIFIED TYPE: Chronic | ICD-10-CM

## 2021-12-02 DIAGNOSIS — M34.9 SCLERODERMA: ICD-10-CM

## 2021-12-02 DIAGNOSIS — M48.07 SPINAL STENOSIS, LUMBOSACRAL REGION: Primary | ICD-10-CM

## 2021-12-02 DIAGNOSIS — E89.0 POSTOPERATIVE HYPOTHYROIDISM: Chronic | ICD-10-CM

## 2021-12-02 PROBLEM — M54.12 RADICULOPATHY, CERVICAL REGION: Status: RESOLVED | Noted: 2021-09-07 | Resolved: 2021-12-02

## 2021-12-02 PROBLEM — K31.84 GASTROPARESIS: Status: RESOLVED | Noted: 2020-09-21 | Resolved: 2021-12-02

## 2021-12-02 PROBLEM — K21.9 GASTROESOPHAGEAL REFLUX DISEASE WITHOUT ESOPHAGITIS: Status: RESOLVED | Noted: 2020-09-21 | Resolved: 2021-12-02

## 2021-12-02 PROCEDURE — 94640 AIRWAY INHALATION TREATMENT: CPT

## 2021-12-02 PROCEDURE — 22558 ARTHRD ANT NTRBD MIN DSC LUM: CPT | Mod: 79,,, | Performed by: ORTHOPAEDIC SURGERY

## 2021-12-02 PROCEDURE — 71000015 HC POSTOP RECOV 1ST HR: Performed by: ORTHOPAEDIC SURGERY

## 2021-12-02 PROCEDURE — 22845 PR ANTERIOR INSTRUMENTATION 2-3 VERTEBRAL SEGMENTS: ICD-10-PCS | Mod: 59,,, | Performed by: ORTHOPAEDIC SURGERY

## 2021-12-02 PROCEDURE — D9220A PRA ANESTHESIA: Mod: ANES,,, | Performed by: ANESTHESIOLOGY

## 2021-12-02 PROCEDURE — 25000003 PHARM REV CODE 250: Performed by: HOSPITALIST

## 2021-12-02 PROCEDURE — 36000713 HC OR TIME LEV V EA ADD 15 MIN: Performed by: ORTHOPAEDIC SURGERY

## 2021-12-02 PROCEDURE — 63600175 PHARM REV CODE 636 W HCPCS: Performed by: ORTHOPAEDIC SURGERY

## 2021-12-02 PROCEDURE — 94761 N-INVAS EAR/PLS OXIMETRY MLT: CPT

## 2021-12-02 PROCEDURE — 25000003 PHARM REV CODE 250: Performed by: NURSE PRACTITIONER

## 2021-12-02 PROCEDURE — 63600175 PHARM REV CODE 636 W HCPCS: Performed by: NURSE ANESTHETIST, CERTIFIED REGISTERED

## 2021-12-02 PROCEDURE — 22853 INSJ BIOMECHANICAL DEVICE: CPT | Mod: ,,, | Performed by: ORTHOPAEDIC SURGERY

## 2021-12-02 PROCEDURE — 99900035 HC TECH TIME PER 15 MIN (STAT)

## 2021-12-02 PROCEDURE — 27000716 HC OXISENSOR PROBE, ANY SIZE: Performed by: ANESTHESIOLOGY

## 2021-12-02 PROCEDURE — 64486 PERIPHERAL BLOCK: ICD-10-PCS | Mod: XU,LT,, | Performed by: ANESTHESIOLOGY

## 2021-12-02 PROCEDURE — 99222 PR INITIAL HOSPITAL CARE,LEVL II: ICD-10-PCS | Mod: ,,, | Performed by: HOSPITALIST

## 2021-12-02 PROCEDURE — 27800903 OPTIME MED/SURG SUP & DEVICES OTHER IMPLANTS: Performed by: ORTHOPAEDIC SURGERY

## 2021-12-02 PROCEDURE — 63600175 PHARM REV CODE 636 W HCPCS: Performed by: ANESTHESIOLOGY

## 2021-12-02 PROCEDURE — D9220A PRA ANESTHESIA: Mod: CRNA,,, | Performed by: NURSE ANESTHETIST, CERTIFIED REGISTERED

## 2021-12-02 PROCEDURE — 37000009 HC ANESTHESIA EA ADD 15 MINS: Performed by: ORTHOPAEDIC SURGERY

## 2021-12-02 PROCEDURE — D9220A PRA ANESTHESIA: ICD-10-PCS | Mod: CRNA,,, | Performed by: NURSE ANESTHETIST, CERTIFIED REGISTERED

## 2021-12-02 PROCEDURE — 25000242 PHARM REV CODE 250 ALT 637 W/ HCPCS: Performed by: HOSPITALIST

## 2021-12-02 PROCEDURE — 22558 PR ARTHRODESIS ANT INTERBODY MIN DISCECTOMY,LUMBAR: ICD-10-PCS | Mod: 79,,, | Performed by: ORTHOPAEDIC SURGERY

## 2021-12-02 PROCEDURE — 11000001 HC ACUTE MED/SURG PRIVATE ROOM

## 2021-12-02 PROCEDURE — 20930 SP BONE ALGRFT MORSEL ADD-ON: CPT | Mod: ,,, | Performed by: ORTHOPAEDIC SURGERY

## 2021-12-02 PROCEDURE — 71000016 HC POSTOP RECOV ADDL HR: Performed by: ORTHOPAEDIC SURGERY

## 2021-12-02 PROCEDURE — 36000712 HC OR TIME LEV V 1ST 15 MIN: Performed by: ORTHOPAEDIC SURGERY

## 2021-12-02 PROCEDURE — 27201423 OPTIME MED/SURG SUP & DEVICES STERILE SUPPLY: Performed by: ORTHOPAEDIC SURGERY

## 2021-12-02 PROCEDURE — 25000003 PHARM REV CODE 250: Performed by: NURSE ANESTHETIST, CERTIFIED REGISTERED

## 2021-12-02 PROCEDURE — 27000510 HC BLANKET BAIR HUGGER ANY SIZE: Performed by: ANESTHESIOLOGY

## 2021-12-02 PROCEDURE — 64486 TAP BLOCK UNIL BY INJECTION: CPT | Mod: XU,LT,, | Performed by: ANESTHESIOLOGY

## 2021-12-02 PROCEDURE — 37000008 HC ANESTHESIA 1ST 15 MINUTES: Performed by: ORTHOPAEDIC SURGERY

## 2021-12-02 PROCEDURE — D9220A PRA ANESTHESIA: ICD-10-PCS | Mod: ANES,,, | Performed by: ANESTHESIOLOGY

## 2021-12-02 PROCEDURE — 99222 1ST HOSP IP/OBS MODERATE 55: CPT | Mod: ,,, | Performed by: HOSPITALIST

## 2021-12-02 PROCEDURE — 22853 PR INSERT BIOMECH DEV W/INTERBODY ARTHRODESIS, EA CONTIGUOUS DEFECT: ICD-10-PCS | Mod: ,,, | Performed by: ORTHOPAEDIC SURGERY

## 2021-12-02 PROCEDURE — 25000003 PHARM REV CODE 250: Performed by: ORTHOPAEDIC SURGERY

## 2021-12-02 PROCEDURE — 20930 PR ALLOGRAFT FOR SPINE SURGERY ONLY MORSELIZED: ICD-10-PCS | Mod: ,,, | Performed by: ORTHOPAEDIC SURGERY

## 2021-12-02 PROCEDURE — 27000165 HC TUBE, ETT CUFFED: Performed by: ANESTHESIOLOGY

## 2021-12-02 PROCEDURE — 97162 PT EVAL MOD COMPLEX 30 MIN: CPT

## 2021-12-02 PROCEDURE — 22585 ARTHRD ANT NTRBD MIN DSC EA: CPT | Mod: ,,, | Performed by: ORTHOPAEDIC SURGERY

## 2021-12-02 PROCEDURE — 22845 INSERT SPINE FIXATION DEVICE: CPT | Mod: 59,,, | Performed by: ORTHOPAEDIC SURGERY

## 2021-12-02 PROCEDURE — 22585 PR ARTHRODESIS ANT INTERBODY MIN DISCECTOMY,EA ADDL: ICD-10-PCS | Mod: ,,, | Performed by: ORTHOPAEDIC SURGERY

## 2021-12-02 PROCEDURE — 27201480 HC GLIDESCOPE: Performed by: ANESTHESIOLOGY

## 2021-12-02 PROCEDURE — C1713 ANCHOR/SCREW BN/BN,TIS/BN: HCPCS | Performed by: ORTHOPAEDIC SURGERY

## 2021-12-02 PROCEDURE — 71000033 HC RECOVERY, INTIAL HOUR: Performed by: ORTHOPAEDIC SURGERY

## 2021-12-02 PROCEDURE — 27000655: Performed by: ANESTHESIOLOGY

## 2021-12-02 DEVICE — IMP LATERAL PLATE: Type: IMPLANTABLE DEVICE | Site: BACK | Status: FUNCTIONAL

## 2021-12-02 DEVICE — IMP SCREW PLATE: Type: IMPLANTABLE DEVICE | Site: BACK | Status: FUNCTIONAL

## 2021-12-02 DEVICE — IMPLANTABLE DEVICE: Type: IMPLANTABLE DEVICE | Site: BACK | Status: FUNCTIONAL

## 2021-12-02 RX ORDER — CYCLOBENZAPRINE HCL 5 MG
5 TABLET ORAL 3 TIMES DAILY PRN
Status: DISCONTINUED | OUTPATIENT
Start: 2021-12-02 | End: 2021-12-07 | Stop reason: HOSPADM

## 2021-12-02 RX ORDER — GABAPENTIN 300 MG/1
600 CAPSULE ORAL ONCE
Status: COMPLETED | OUTPATIENT
Start: 2021-12-02 | End: 2021-12-02

## 2021-12-02 RX ORDER — PROPOFOL 10 MG/ML
VIAL (ML) INTRAVENOUS
Status: DISCONTINUED | OUTPATIENT
Start: 2021-12-02 | End: 2021-12-02

## 2021-12-02 RX ORDER — ONDANSETRON 2 MG/ML
4 INJECTION INTRAMUSCULAR; INTRAVENOUS DAILY PRN
Status: DISCONTINUED | OUTPATIENT
Start: 2021-12-02 | End: 2021-12-02 | Stop reason: HOSPADM

## 2021-12-02 RX ORDER — ROPIVACAINE HYDROCHLORIDE 7.5 MG/ML
INJECTION, SOLUTION EPIDURAL; PERINEURAL
Status: COMPLETED | OUTPATIENT
Start: 2021-12-02 | End: 2021-12-02

## 2021-12-02 RX ORDER — MORPHINE SULFATE 2 MG/ML
2 INJECTION, SOLUTION INTRAMUSCULAR; INTRAVENOUS
Status: DISCONTINUED | OUTPATIENT
Start: 2021-12-02 | End: 2021-12-05

## 2021-12-02 RX ORDER — AMOXICILLIN 250 MG
1 CAPSULE ORAL 2 TIMES DAILY
Status: DISCONTINUED | OUTPATIENT
Start: 2021-12-02 | End: 2021-12-07 | Stop reason: HOSPADM

## 2021-12-02 RX ORDER — CLINDAMYCIN PHOSPHATE 900 MG/50ML
INJECTION, SOLUTION INTRAVENOUS
Status: DISCONTINUED | OUTPATIENT
Start: 2021-12-02 | End: 2021-12-02

## 2021-12-02 RX ORDER — FERROUS GLUCONATE 324(38)MG
324 TABLET ORAL 2 TIMES DAILY
Status: DISCONTINUED | OUTPATIENT
Start: 2021-12-02 | End: 2021-12-07 | Stop reason: HOSPADM

## 2021-12-02 RX ORDER — DOCUSATE SODIUM 100 MG/1
100 CAPSULE, LIQUID FILLED ORAL 2 TIMES DAILY
Status: DISCONTINUED | OUTPATIENT
Start: 2021-12-02 | End: 2021-12-07 | Stop reason: HOSPADM

## 2021-12-02 RX ORDER — SODIUM CHLORIDE 9 MG/ML
INJECTION, SOLUTION INTRAVENOUS CONTINUOUS
Status: DISCONTINUED | OUTPATIENT
Start: 2021-12-02 | End: 2021-12-02

## 2021-12-02 RX ORDER — VALSARTAN 320 MG/1
320 TABLET ORAL DAILY
Status: DISCONTINUED | OUTPATIENT
Start: 2021-12-02 | End: 2021-12-07 | Stop reason: HOSPADM

## 2021-12-02 RX ORDER — ALBUTEROL SULFATE 90 UG/1
2 AEROSOL, METERED RESPIRATORY (INHALATION) EVERY 6 HOURS PRN
Status: DISCONTINUED | OUTPATIENT
Start: 2021-12-02 | End: 2021-12-07 | Stop reason: HOSPADM

## 2021-12-02 RX ORDER — MONTELUKAST SODIUM 10 MG/1
10 TABLET ORAL DAILY
Status: DISCONTINUED | OUTPATIENT
Start: 2021-12-02 | End: 2021-12-07 | Stop reason: HOSPADM

## 2021-12-02 RX ORDER — MORPHINE SULFATE 10 MG/ML
4 INJECTION INTRAMUSCULAR; INTRAVENOUS; SUBCUTANEOUS EVERY 5 MIN PRN
Status: DISCONTINUED | OUTPATIENT
Start: 2021-12-02 | End: 2021-12-02 | Stop reason: HOSPADM

## 2021-12-02 RX ORDER — FENTANYL CITRATE 50 UG/ML
INJECTION, SOLUTION INTRAMUSCULAR; INTRAVENOUS
Status: DISCONTINUED | OUTPATIENT
Start: 2021-12-02 | End: 2021-12-02

## 2021-12-02 RX ORDER — GABAPENTIN 300 MG/1
600 CAPSULE ORAL 3 TIMES DAILY
Status: DISCONTINUED | OUTPATIENT
Start: 2021-12-02 | End: 2021-12-07 | Stop reason: HOSPADM

## 2021-12-02 RX ORDER — PANTOPRAZOLE SODIUM 40 MG/1
40 TABLET, DELAYED RELEASE ORAL 2 TIMES DAILY
COMMUNITY
End: 2021-12-28

## 2021-12-02 RX ORDER — DIPHENHYDRAMINE HYDROCHLORIDE 50 MG/ML
25 INJECTION INTRAMUSCULAR; INTRAVENOUS EVERY 6 HOURS PRN
Status: DISCONTINUED | OUTPATIENT
Start: 2021-12-02 | End: 2021-12-02 | Stop reason: HOSPADM

## 2021-12-02 RX ORDER — MUPIROCIN 20 MG/G
OINTMENT TOPICAL 2 TIMES DAILY
Status: DISCONTINUED | OUTPATIENT
Start: 2021-12-02 | End: 2021-12-07 | Stop reason: HOSPADM

## 2021-12-02 RX ORDER — TOPIRAMATE 25 MG/1
25 TABLET ORAL 2 TIMES DAILY
Status: DISCONTINUED | OUTPATIENT
Start: 2021-12-02 | End: 2021-12-07 | Stop reason: HOSPADM

## 2021-12-02 RX ORDER — MIDAZOLAM HYDROCHLORIDE 1 MG/ML
INJECTION INTRAMUSCULAR; INTRAVENOUS
Status: DISCONTINUED | OUTPATIENT
Start: 2021-12-02 | End: 2021-12-02

## 2021-12-02 RX ORDER — HYDROMORPHONE HYDROCHLORIDE 2 MG/ML
0.5 INJECTION, SOLUTION INTRAMUSCULAR; INTRAVENOUS; SUBCUTANEOUS EVERY 5 MIN PRN
Status: DISCONTINUED | OUTPATIENT
Start: 2021-12-02 | End: 2021-12-02 | Stop reason: HOSPADM

## 2021-12-02 RX ORDER — ONDANSETRON 2 MG/ML
4 INJECTION INTRAMUSCULAR; INTRAVENOUS EVERY 12 HOURS PRN
Status: DISCONTINUED | OUTPATIENT
Start: 2021-12-02 | End: 2021-12-07 | Stop reason: HOSPADM

## 2021-12-02 RX ORDER — LEVOTHYROXINE SODIUM 75 UG/1
75 TABLET ORAL DAILY
Status: DISCONTINUED | OUTPATIENT
Start: 2021-12-02 | End: 2021-12-07 | Stop reason: HOSPADM

## 2021-12-02 RX ORDER — MUPIROCIN 20 MG/G
OINTMENT TOPICAL
Status: DISCONTINUED | OUTPATIENT
Start: 2021-12-02 | End: 2021-12-02 | Stop reason: HOSPADM

## 2021-12-02 RX ORDER — AMLODIPINE BESYLATE 5 MG/1
5 TABLET ORAL DAILY
Status: DISCONTINUED | OUTPATIENT
Start: 2021-12-02 | End: 2021-12-07 | Stop reason: HOSPADM

## 2021-12-02 RX ORDER — ONDANSETRON 2 MG/ML
INJECTION INTRAMUSCULAR; INTRAVENOUS
Status: DISCONTINUED | OUTPATIENT
Start: 2021-12-02 | End: 2021-12-02

## 2021-12-02 RX ORDER — TIZANIDINE 4 MG/1
4 TABLET ORAL EVERY 8 HOURS
Status: DISCONTINUED | OUTPATIENT
Start: 2021-12-02 | End: 2021-12-07 | Stop reason: HOSPADM

## 2021-12-02 RX ORDER — CELECOXIB 100 MG/1
200 CAPSULE ORAL ONCE
Status: COMPLETED | OUTPATIENT
Start: 2021-12-02 | End: 2021-12-02

## 2021-12-02 RX ORDER — IPRATROPIUM BROMIDE AND ALBUTEROL SULFATE 2.5; .5 MG/3ML; MG/3ML
3 SOLUTION RESPIRATORY (INHALATION) EVERY 12 HOURS
Status: DISCONTINUED | OUTPATIENT
Start: 2021-12-02 | End: 2021-12-07 | Stop reason: HOSPADM

## 2021-12-02 RX ORDER — OXYCODONE HYDROCHLORIDE 5 MG/1
10 TABLET ORAL EVERY 4 HOURS PRN
Status: DISCONTINUED | OUTPATIENT
Start: 2021-12-02 | End: 2021-12-07 | Stop reason: HOSPADM

## 2021-12-02 RX ORDER — ISOSORBIDE MONONITRATE 30 MG/1
30 TABLET, EXTENDED RELEASE ORAL DAILY
Status: DISCONTINUED | OUTPATIENT
Start: 2021-12-02 | End: 2021-12-07 | Stop reason: HOSPADM

## 2021-12-02 RX ORDER — METOCLOPRAMIDE HYDROCHLORIDE 5 MG/ML
5 INJECTION INTRAMUSCULAR; INTRAVENOUS EVERY 6 HOURS PRN
Status: DISCONTINUED | OUTPATIENT
Start: 2021-12-02 | End: 2021-12-07 | Stop reason: HOSPADM

## 2021-12-02 RX ORDER — FAMOTIDINE 20 MG/1
20 TABLET, FILM COATED ORAL 2 TIMES DAILY
Status: DISCONTINUED | OUTPATIENT
Start: 2021-12-02 | End: 2021-12-07 | Stop reason: HOSPADM

## 2021-12-02 RX ORDER — MEPERIDINE HYDROCHLORIDE 25 MG/ML
25 INJECTION INTRAMUSCULAR; INTRAVENOUS; SUBCUTANEOUS EVERY 10 MIN PRN
Status: DISCONTINUED | OUTPATIENT
Start: 2021-12-02 | End: 2021-12-02 | Stop reason: HOSPADM

## 2021-12-02 RX ORDER — CHOLECALCIFEROL (VITAMIN D3) 25 MCG
1000 TABLET ORAL DAILY
Status: DISCONTINUED | OUTPATIENT
Start: 2021-12-02 | End: 2021-12-07 | Stop reason: HOSPADM

## 2021-12-02 RX ORDER — ROCURONIUM BROMIDE 10 MG/ML
INJECTION, SOLUTION INTRAVENOUS
Status: DISCONTINUED | OUTPATIENT
Start: 2021-12-02 | End: 2021-12-02

## 2021-12-02 RX ORDER — VANCOMYCIN HYDROCHLORIDE 1 G/20ML
INJECTION, POWDER, LYOPHILIZED, FOR SOLUTION INTRAVENOUS
Status: DISCONTINUED | OUTPATIENT
Start: 2021-12-02 | End: 2021-12-02 | Stop reason: HOSPADM

## 2021-12-02 RX ORDER — ALBUTEROL SULFATE 90 UG/1
2 AEROSOL, METERED RESPIRATORY (INHALATION) EVERY 6 HOURS PRN
COMMUNITY

## 2021-12-02 RX ORDER — TRANEXAMIC ACID 100 MG/ML
INJECTION, SOLUTION INTRAVENOUS
Status: DISCONTINUED | OUTPATIENT
Start: 2021-12-02 | End: 2021-12-02

## 2021-12-02 RX ORDER — FUROSEMIDE 20 MG/1
20 TABLET ORAL DAILY
Status: DISCONTINUED | OUTPATIENT
Start: 2021-12-02 | End: 2021-12-07 | Stop reason: HOSPADM

## 2021-12-02 RX ORDER — CLINDAMYCIN PHOSPHATE 600 MG/50ML
600 INJECTION, SOLUTION INTRAVENOUS
Status: COMPLETED | OUTPATIENT
Start: 2021-12-02 | End: 2021-12-03

## 2021-12-02 RX ORDER — PANTOPRAZOLE SODIUM 40 MG/1
40 TABLET, DELAYED RELEASE ORAL DAILY
Status: DISCONTINUED | OUTPATIENT
Start: 2021-12-02 | End: 2021-12-04

## 2021-12-02 RX ORDER — LIDOCAINE HYDROCHLORIDE 20 MG/ML
INJECTION, SOLUTION EPIDURAL; INFILTRATION; INTRACAUDAL; PERINEURAL
Status: DISCONTINUED | OUTPATIENT
Start: 2021-12-02 | End: 2021-12-02

## 2021-12-02 RX ORDER — MYCOPHENOLATE MOFETIL 250 MG/1
500 CAPSULE ORAL 2 TIMES DAILY
Status: DISCONTINUED | OUTPATIENT
Start: 2021-12-02 | End: 2021-12-07 | Stop reason: HOSPADM

## 2021-12-02 RX ORDER — ACETAMINOPHEN 500 MG
500 TABLET ORAL EVERY 4 HOURS
Status: DISCONTINUED | OUTPATIENT
Start: 2021-12-02 | End: 2021-12-07 | Stop reason: HOSPADM

## 2021-12-02 RX ORDER — PANTOPRAZOLE SODIUM 40 MG/1
40 TABLET, DELAYED RELEASE ORAL 2 TIMES DAILY
Status: DISCONTINUED | OUTPATIENT
Start: 2021-12-02 | End: 2021-12-04

## 2021-12-02 RX ORDER — OXYCODONE HCL 10 MG/1
10 TABLET, FILM COATED, EXTENDED RELEASE ORAL ONCE
Status: COMPLETED | OUTPATIENT
Start: 2021-12-02 | End: 2021-12-02

## 2021-12-02 RX ORDER — OXYBUTYNIN CHLORIDE 5 MG/1
10 TABLET, EXTENDED RELEASE ORAL DAILY
Status: DISCONTINUED | OUTPATIENT
Start: 2021-12-02 | End: 2021-12-07 | Stop reason: HOSPADM

## 2021-12-02 RX ORDER — OXYCODONE HYDROCHLORIDE 5 MG/1
5 TABLET ORAL
Status: DISCONTINUED | OUTPATIENT
Start: 2021-12-02 | End: 2021-12-04

## 2021-12-02 RX ORDER — AMITRIPTYLINE HYDROCHLORIDE 25 MG/1
25 TABLET, FILM COATED ORAL NIGHTLY
Status: DISCONTINUED | OUTPATIENT
Start: 2021-12-02 | End: 2021-12-07 | Stop reason: HOSPADM

## 2021-12-02 RX ORDER — SODIUM CHLORIDE 0.9 % (FLUSH) 0.9 %
10 SYRINGE (ML) INJECTION
Status: DISCONTINUED | OUTPATIENT
Start: 2021-12-02 | End: 2021-12-07 | Stop reason: HOSPADM

## 2021-12-02 RX ORDER — DULOXETIN HYDROCHLORIDE 30 MG/1
60 CAPSULE, DELAYED RELEASE ORAL DAILY
Status: DISCONTINUED | OUTPATIENT
Start: 2021-12-02 | End: 2021-12-04

## 2021-12-02 RX ORDER — SODIUM CHLORIDE, SODIUM LACTATE, POTASSIUM CHLORIDE, CALCIUM CHLORIDE 600; 310; 30; 20 MG/100ML; MG/100ML; MG/100ML; MG/100ML
INJECTION, SOLUTION INTRAVENOUS CONTINUOUS
Status: DISCONTINUED | OUTPATIENT
Start: 2021-12-02 | End: 2021-12-02

## 2021-12-02 RX ORDER — POLYETHYLENE GLYCOL 3350 17 G/17G
17 POWDER, FOR SOLUTION ORAL DAILY
Status: DISCONTINUED | OUTPATIENT
Start: 2021-12-02 | End: 2021-12-07 | Stop reason: HOSPADM

## 2021-12-02 RX ORDER — DEXAMETHASONE SODIUM PHOSPHATE 4 MG/ML
INJECTION, SOLUTION INTRA-ARTICULAR; INTRALESIONAL; INTRAMUSCULAR; INTRAVENOUS; SOFT TISSUE
Status: DISCONTINUED | OUTPATIENT
Start: 2021-12-02 | End: 2021-12-02

## 2021-12-02 RX ORDER — NITROGLYCERIN 0.4 MG/1
0.4 TABLET SUBLINGUAL EVERY 5 MIN PRN
Status: DISCONTINUED | OUTPATIENT
Start: 2021-12-02 | End: 2021-12-07 | Stop reason: HOSPADM

## 2021-12-02 RX ORDER — CARVEDILOL 25 MG/1
25 TABLET ORAL 2 TIMES DAILY
Status: DISCONTINUED | OUTPATIENT
Start: 2021-12-02 | End: 2021-12-07 | Stop reason: HOSPADM

## 2021-12-02 RX ORDER — EPINEPHRINE CONVENIENCE KIT 1 MG/ML(1)
KIT INTRAMUSCULAR; SUBCUTANEOUS
Status: DISCONTINUED | OUTPATIENT
Start: 2021-12-02 | End: 2021-12-02 | Stop reason: HOSPADM

## 2021-12-02 RX ORDER — ATORVASTATIN CALCIUM 20 MG/1
20 TABLET, FILM COATED ORAL DAILY
Status: DISCONTINUED | OUTPATIENT
Start: 2021-12-02 | End: 2021-12-07 | Stop reason: HOSPADM

## 2021-12-02 RX ORDER — LIDOCAINE HYDROCHLORIDE 10 MG/ML
1 INJECTION, SOLUTION EPIDURAL; INFILTRATION; INTRACAUDAL; PERINEURAL ONCE
Status: DISCONTINUED | OUTPATIENT
Start: 2021-12-02 | End: 2021-12-02 | Stop reason: HOSPADM

## 2021-12-02 RX ADMIN — TIZANIDINE 4 MG: 4 TABLET ORAL at 09:12

## 2021-12-02 RX ADMIN — AMITRIPTYLINE HYDROCHLORIDE 25 MG: 25 TABLET, FILM COATED ORAL at 09:12

## 2021-12-02 RX ADMIN — FAMOTIDINE 20 MG: 20 TABLET ORAL at 09:12

## 2021-12-02 RX ADMIN — TRANEXAMIC ACID 1000 MG: 100 INJECTION, SOLUTION INTRAVENOUS at 08:12

## 2021-12-02 RX ADMIN — GABAPENTIN 600 MG: 300 CAPSULE ORAL at 03:12

## 2021-12-02 RX ADMIN — CLINDAMYCIN IN 5 PERCENT DEXTROSE 900 MG: 18 INJECTION, SOLUTION INTRAVENOUS at 08:12

## 2021-12-02 RX ADMIN — FERROUS GLUCONATE 324 MG: 324 TABLET ORAL at 09:12

## 2021-12-02 RX ADMIN — IPRATROPIUM BROMIDE AND ALBUTEROL SULFATE 3 ML: .5; 3 SOLUTION RESPIRATORY (INHALATION) at 07:12

## 2021-12-02 RX ADMIN — HYDROMORPHONE HYDROCHLORIDE 0.5 MG: 2 INJECTION INTRAMUSCULAR; INTRAVENOUS; SUBCUTANEOUS at 10:12

## 2021-12-02 RX ADMIN — FENTANYL CITRATE 100 MCG: 50 INJECTION INTRAMUSCULAR; INTRAVENOUS at 07:12

## 2021-12-02 RX ADMIN — LEVOTHYROXINE SODIUM 75 MCG: 75 TABLET ORAL at 05:12

## 2021-12-02 RX ADMIN — GABAPENTIN 600 MG: 300 CAPSULE ORAL at 09:12

## 2021-12-02 RX ADMIN — SODIUM CHLORIDE: 9 INJECTION, SOLUTION INTRAVENOUS at 10:12

## 2021-12-02 RX ADMIN — OXYCODONE HYDROCHLORIDE 5 MG: 5 TABLET ORAL at 09:12

## 2021-12-02 RX ADMIN — ACETAMINOPHEN 500 MG: 500 TABLET ORAL at 05:12

## 2021-12-02 RX ADMIN — FUROSEMIDE 20 MG: 20 TABLET ORAL at 04:12

## 2021-12-02 RX ADMIN — CLINDAMYCIN PHOSPHATE 600 MG: 600 INJECTION, SOLUTION INTRAVENOUS at 04:12

## 2021-12-02 RX ADMIN — OXYCODONE HYDROCHLORIDE 10 MG: 5 TABLET ORAL at 04:12

## 2021-12-02 RX ADMIN — DOCUSATE SODIUM 100 MG: 100 CAPSULE ORAL at 03:12

## 2021-12-02 RX ADMIN — TOPIRAMATE 25 MG: 25 TABLET, FILM COATED ORAL at 09:12

## 2021-12-02 RX ADMIN — POTASSIUM BICARBONATE 20 MEQ: 391 TABLET, EFFERVESCENT ORAL at 03:12

## 2021-12-02 RX ADMIN — ONDANSETRON 8 MG: 2 INJECTION INTRAMUSCULAR; INTRAVENOUS at 07:12

## 2021-12-02 RX ADMIN — ATORVASTATIN CALCIUM 20 MG: 20 TABLET, FILM COATED ORAL at 03:12

## 2021-12-02 RX ADMIN — ROCURONIUM BROMIDE 50 MG: 10 INJECTION, SOLUTION INTRAVENOUS at 07:12

## 2021-12-02 RX ADMIN — CARVEDILOL 25 MG: 25 TABLET, FILM COATED ORAL at 09:12

## 2021-12-02 RX ADMIN — ACETAMINOPHEN 500 MG: 500 TABLET ORAL at 04:12

## 2021-12-02 RX ADMIN — PANTOPRAZOLE SODIUM 40 MG: 40 TABLET, DELAYED RELEASE ORAL at 03:12

## 2021-12-02 RX ADMIN — ROPIVACAINE HYDROCHLORIDE 10 ML: 7.5 INJECTION, SOLUTION EPIDURAL; PERINEURAL at 10:12

## 2021-12-02 RX ADMIN — PROPOFOL 200 MG: 10 INJECTION, EMULSION INTRAVENOUS at 07:12

## 2021-12-02 RX ADMIN — PANTOPRAZOLE SODIUM 40 MG: 40 TABLET, DELAYED RELEASE ORAL at 09:12

## 2021-12-02 RX ADMIN — MYCOPHENOLATE MOFETIL 500 MG: 250 CAPSULE ORAL at 09:12

## 2021-12-02 RX ADMIN — TRANEXAMIC ACID 1000 MG: 100 INJECTION, SOLUTION INTRAVENOUS at 09:12

## 2021-12-02 RX ADMIN — SENNOSIDES AND DOCUSATE SODIUM 1 TABLET: 50; 8.6 TABLET ORAL at 09:12

## 2021-12-02 RX ADMIN — LIDOCAINE HYDROCHLORIDE 100 MG: 20 INJECTION, SOLUTION INTRAVENOUS at 07:12

## 2021-12-02 RX ADMIN — AMLODIPINE BESYLATE 5 MG: 5 TABLET ORAL at 09:12

## 2021-12-02 RX ADMIN — DEXAMETHASONE SODIUM PHOSPHATE 20 MG: 4 INJECTION, SOLUTION INTRA-ARTICULAR; INTRALESIONAL; INTRAMUSCULAR; INTRAVENOUS; SOFT TISSUE at 07:12

## 2021-12-02 RX ADMIN — OXYBUTYNIN CHLORIDE 10 MG: 5 TABLET, EXTENDED RELEASE ORAL at 03:12

## 2021-12-02 RX ADMIN — OXYCODONE HYDROCHLORIDE 10 MG: 10 TABLET, FILM COATED, EXTENDED RELEASE ORAL at 07:12

## 2021-12-02 RX ADMIN — Medication 1000 UNITS: at 03:12

## 2021-12-02 RX ADMIN — CLINDAMYCIN PHOSPHATE 600 MG: 600 INJECTION, SOLUTION INTRAVENOUS at 09:12

## 2021-12-02 RX ADMIN — CYCLOBENZAPRINE 5 MG: 5 TABLET, FILM COATED ORAL at 05:12

## 2021-12-02 RX ADMIN — SODIUM CHLORIDE: 9 INJECTION, SOLUTION INTRAVENOUS at 07:12

## 2021-12-02 RX ADMIN — ISOSORBIDE MONONITRATE 30 MG: 30 TABLET, EXTENDED RELEASE ORAL at 03:12

## 2021-12-02 RX ADMIN — SUGAMMADEX 200 MG: 100 INJECTION, SOLUTION INTRAVENOUS at 09:12

## 2021-12-02 RX ADMIN — GABAPENTIN 600 MG: 300 CAPSULE ORAL at 07:12

## 2021-12-02 RX ADMIN — MIDAZOLAM 2 MG: 1 INJECTION INTRAMUSCULAR; INTRAVENOUS at 07:12

## 2021-12-02 RX ADMIN — DOCUSATE SODIUM 100 MG: 100 CAPSULE ORAL at 09:12

## 2021-12-02 RX ADMIN — MUPIROCIN: 20 OINTMENT TOPICAL at 04:12

## 2021-12-02 RX ADMIN — CELECOXIB 200 MG: 100 CAPSULE ORAL at 07:12

## 2021-12-02 RX ADMIN — ACETAMINOPHEN 500 MG: 500 TABLET ORAL at 09:12

## 2021-12-02 RX ADMIN — MONTELUKAST 10 MG: 10 TABLET, FILM COATED ORAL at 03:12

## 2021-12-02 RX ADMIN — DULOXETINE 60 MG: 30 CAPSULE, DELAYED RELEASE ORAL at 03:12

## 2021-12-02 RX ADMIN — FAMOTIDINE 20 MG: 20 TABLET ORAL at 04:12

## 2021-12-02 RX ADMIN — SODIUM CHLORIDE: 9 INJECTION, SOLUTION INTRAVENOUS at 06:12

## 2021-12-02 RX ADMIN — SENNOSIDES AND DOCUSATE SODIUM 1 TABLET: 50; 8.6 TABLET ORAL at 04:12

## 2021-12-03 ENCOUNTER — ANESTHESIA (OUTPATIENT)
Dept: SURGERY | Facility: HOSPITAL | Age: 71
DRG: 455 | End: 2021-12-03
Payer: COMMERCIAL

## 2021-12-03 ENCOUNTER — ANESTHESIA EVENT (OUTPATIENT)
Dept: SURGERY | Facility: HOSPITAL | Age: 71
DRG: 455 | End: 2021-12-03
Payer: COMMERCIAL

## 2021-12-03 LAB
ANION GAP SERPL CALCULATED.3IONS-SCNC: 11 MMOL/L (ref 7–16)
BASOPHILS # BLD AUTO: 0.01 K/UL (ref 0–0.2)
BASOPHILS NFR BLD AUTO: 0.1 % (ref 0–1)
BUN SERPL-MCNC: 10 MG/DL (ref 7–18)
BUN/CREAT SERPL: 14 (ref 6–20)
CALCIUM SERPL-MCNC: 8.9 MG/DL (ref 8.5–10.1)
CHLORIDE SERPL-SCNC: 107 MMOL/L (ref 98–107)
CO2 SERPL-SCNC: 24 MMOL/L (ref 21–32)
CREAT SERPL-MCNC: 0.71 MG/DL (ref 0.55–1.02)
DIFFERENTIAL METHOD BLD: ABNORMAL
EOSINOPHIL # BLD AUTO: 0 K/UL (ref 0–0.5)
EOSINOPHIL NFR BLD AUTO: 0 % (ref 1–4)
ERYTHROCYTE [DISTWIDTH] IN BLOOD BY AUTOMATED COUNT: 14.4 % (ref 11.5–14.5)
GLUCOSE SERPL-MCNC: 121 MG/DL (ref 74–106)
HCT VFR BLD AUTO: 25.6 % (ref 38–47)
HCT VFR BLD AUTO: 31.4 % (ref 38–47)
HGB BLD-MCNC: 8.2 G/DL (ref 12–16)
HGB BLD-MCNC: 9.3 G/DL (ref 12–16)
IMM GRANULOCYTES # BLD AUTO: 0.13 K/UL (ref 0–0.04)
IMM GRANULOCYTES NFR BLD: 1.1 % (ref 0–0.4)
LYMPHOCYTES # BLD AUTO: 0.78 K/UL (ref 1–4.8)
LYMPHOCYTES NFR BLD AUTO: 6.7 % (ref 27–41)
MCH RBC QN AUTO: 24.3 PG (ref 27–31)
MCHC RBC AUTO-ENTMCNC: 29.6 G/DL (ref 32–36)
MCV RBC AUTO: 82 FL (ref 80–96)
MONOCYTES # BLD AUTO: 0.78 K/UL (ref 0–0.8)
MONOCYTES NFR BLD AUTO: 6.7 % (ref 2–6)
MPC BLD CALC-MCNC: 10.8 FL (ref 9.4–12.4)
NEUTROPHILS # BLD AUTO: 9.87 K/UL (ref 1.8–7.7)
NEUTROPHILS NFR BLD AUTO: 85.4 % (ref 53–65)
NRBC # BLD AUTO: 0 X10E3/UL
NRBC, AUTO (.00): 0 %
PLATELET # BLD AUTO: 383 K/UL (ref 150–400)
POTASSIUM SERPL-SCNC: 4.1 MMOL/L (ref 3.5–5.1)
RBC # BLD AUTO: 3.83 M/UL (ref 4.2–5.4)
SODIUM SERPL-SCNC: 138 MMOL/L (ref 136–145)
WBC # BLD AUTO: 11.57 K/UL (ref 4.5–11)

## 2021-12-03 PROCEDURE — 36000710: Performed by: ORTHOPAEDIC SURGERY

## 2021-12-03 PROCEDURE — 36000711: Performed by: ORTHOPAEDIC SURGERY

## 2021-12-03 PROCEDURE — C1713 ANCHOR/SCREW BN/BN,TIS/BN: HCPCS | Performed by: ORTHOPAEDIC SURGERY

## 2021-12-03 PROCEDURE — 25000003 PHARM REV CODE 250: Performed by: ORTHOPAEDIC SURGERY

## 2021-12-03 PROCEDURE — 20930 PR ALLOGRAFT FOR SPINE SURGERY ONLY MORSELIZED: ICD-10-PCS | Mod: ,,, | Performed by: ORTHOPAEDIC SURGERY

## 2021-12-03 PROCEDURE — 22634 PR ARTHRODESIS, COMBINED TECHN, SNGL INTERSPACE, EA ADDTL: ICD-10-PCS | Mod: ,,, | Performed by: ORTHOPAEDIC SURGERY

## 2021-12-03 PROCEDURE — 80048 BASIC METABOLIC PNL TOTAL CA: CPT

## 2021-12-03 PROCEDURE — 22634 ARTHRD CMBN 1NTRSPC EA ADDL: CPT | Mod: ,,, | Performed by: ORTHOPAEDIC SURGERY

## 2021-12-03 PROCEDURE — 27202344 HC EYESHIELD: Performed by: ANESTHESIOLOGY

## 2021-12-03 PROCEDURE — 27000509 HC TUBE SALEM SUMP ANY SIZE: Performed by: ANESTHESIOLOGY

## 2021-12-03 PROCEDURE — C1729 CATH, DRAINAGE: HCPCS | Performed by: ORTHOPAEDIC SURGERY

## 2021-12-03 PROCEDURE — 27000221 HC OXYGEN, UP TO 24 HOURS

## 2021-12-03 PROCEDURE — 27000165 HC TUBE, ETT CUFFED: Performed by: ANESTHESIOLOGY

## 2021-12-03 PROCEDURE — D9220A PRA ANESTHESIA: Mod: ANES,,, | Performed by: ANESTHESIOLOGY

## 2021-12-03 PROCEDURE — 63600175 PHARM REV CODE 636 W HCPCS: Performed by: NURSE ANESTHETIST, CERTIFIED REGISTERED

## 2021-12-03 PROCEDURE — D9220A PRA ANESTHESIA: ICD-10-PCS | Mod: ANES,,, | Performed by: ANESTHESIOLOGY

## 2021-12-03 PROCEDURE — 85018 HEMOGLOBIN: CPT | Performed by: ANESTHESIOLOGY

## 2021-12-03 PROCEDURE — 22842 PR POSTERIOR SEGMENTAL INSTRUMENTATION 3-6 VRT SEG: ICD-10-PCS | Mod: ,,, | Performed by: ORTHOPAEDIC SURGERY

## 2021-12-03 PROCEDURE — D9220A PRA ANESTHESIA: Mod: CRNA,,, | Performed by: NURSE ANESTHETIST, CERTIFIED REGISTERED

## 2021-12-03 PROCEDURE — 27000260 *HC AIRWAY ORAL: Performed by: ANESTHESIOLOGY

## 2021-12-03 PROCEDURE — D9220A PRA ANESTHESIA: ICD-10-PCS | Mod: CRNA,,, | Performed by: NURSE ANESTHETIST, CERTIFIED REGISTERED

## 2021-12-03 PROCEDURE — 27201480 HC GLIDESCOPE: Performed by: ANESTHESIOLOGY

## 2021-12-03 PROCEDURE — 63600175 PHARM REV CODE 636 W HCPCS: Performed by: ORTHOPAEDIC SURGERY

## 2021-12-03 PROCEDURE — 20936 SP BONE AGRFT LOCAL ADD-ON: CPT | Mod: ,,, | Performed by: ORTHOPAEDIC SURGERY

## 2021-12-03 PROCEDURE — 11000001 HC ACUTE MED/SURG PRIVATE ROOM

## 2021-12-03 PROCEDURE — 27201423 OPTIME MED/SURG SUP & DEVICES STERILE SUPPLY: Performed by: ORTHOPAEDIC SURGERY

## 2021-12-03 PROCEDURE — 20930 SP BONE ALGRFT MORSEL ADD-ON: CPT | Mod: ,,, | Performed by: ORTHOPAEDIC SURGERY

## 2021-12-03 PROCEDURE — 22842 INSERT SPINE FIXATION DEVICE: CPT | Mod: ,,, | Performed by: ORTHOPAEDIC SURGERY

## 2021-12-03 PROCEDURE — 27800903 OPTIME MED/SURG SUP & DEVICES OTHER IMPLANTS: Performed by: ORTHOPAEDIC SURGERY

## 2021-12-03 PROCEDURE — 99232 PR SUBSEQUENT HOSPITAL CARE,LEVL II: ICD-10-PCS | Mod: GC,,, | Performed by: FAMILY MEDICINE

## 2021-12-03 PROCEDURE — 25000003 PHARM REV CODE 250: Performed by: NURSE PRACTITIONER

## 2021-12-03 PROCEDURE — 99232 SBSQ HOSP IP/OBS MODERATE 35: CPT | Mod: GC,,, | Performed by: FAMILY MEDICINE

## 2021-12-03 PROCEDURE — 20936 PR AUTOGRAFT SPINE SURGERY LOCAL FROM SAME INCISION: ICD-10-PCS | Mod: ,,, | Performed by: ORTHOPAEDIC SURGERY

## 2021-12-03 PROCEDURE — 37000009 HC ANESTHESIA EA ADD 15 MINS: Performed by: ORTHOPAEDIC SURGERY

## 2021-12-03 PROCEDURE — 27000716 HC OXISENSOR PROBE, ANY SIZE: Performed by: ANESTHESIOLOGY

## 2021-12-03 PROCEDURE — 36415 COLL VENOUS BLD VENIPUNCTURE: CPT | Performed by: ANESTHESIOLOGY

## 2021-12-03 PROCEDURE — 97166 OT EVAL MOD COMPLEX 45 MIN: CPT

## 2021-12-03 PROCEDURE — 63600175 PHARM REV CODE 636 W HCPCS: Performed by: ANESTHESIOLOGY

## 2021-12-03 PROCEDURE — 37000008 HC ANESTHESIA 1ST 15 MINUTES: Performed by: ORTHOPAEDIC SURGERY

## 2021-12-03 PROCEDURE — 22214: ICD-10-PCS | Mod: 51,58,79, | Performed by: ORTHOPAEDIC SURGERY

## 2021-12-03 PROCEDURE — 25000242 PHARM REV CODE 250 ALT 637 W/ HCPCS: Performed by: HOSPITALIST

## 2021-12-03 PROCEDURE — 22633 ARTHRD CMBN 1NTRSPC LUMBAR: CPT | Mod: 58,79,, | Performed by: ORTHOPAEDIC SURGERY

## 2021-12-03 PROCEDURE — 85025 COMPLETE CBC W/AUTO DIFF WBC: CPT

## 2021-12-03 PROCEDURE — 22214 INCIS 1 VERTEBRAL SEG LUMBAR: CPT | Mod: 51,58,79, | Performed by: ORTHOPAEDIC SURGERY

## 2021-12-03 PROCEDURE — 94761 N-INVAS EAR/PLS OXIMETRY MLT: CPT

## 2021-12-03 PROCEDURE — 85014 HEMATOCRIT: CPT | Performed by: ANESTHESIOLOGY

## 2021-12-03 PROCEDURE — 36415 COLL VENOUS BLD VENIPUNCTURE: CPT

## 2021-12-03 PROCEDURE — 94640 AIRWAY INHALATION TREATMENT: CPT

## 2021-12-03 PROCEDURE — 63600175 PHARM REV CODE 636 W HCPCS: Performed by: FAMILY MEDICINE

## 2021-12-03 PROCEDURE — 97110 THERAPEUTIC EXERCISES: CPT

## 2021-12-03 PROCEDURE — 71000033 HC RECOVERY, INTIAL HOUR: Performed by: ORTHOPAEDIC SURGERY

## 2021-12-03 PROCEDURE — 22633 PR ARTHRODESIS, COMBINED TECHN, SNGL INTERSPACE, LUMBAR: ICD-10-PCS | Mod: 58,79,, | Performed by: ORTHOPAEDIC SURGERY

## 2021-12-03 PROCEDURE — 97116 GAIT TRAINING THERAPY: CPT

## 2021-12-03 PROCEDURE — 25000003 PHARM REV CODE 250: Performed by: NURSE ANESTHETIST, CERTIFIED REGISTERED

## 2021-12-03 PROCEDURE — 27000510 HC BLANKET BAIR HUGGER ANY SIZE: Performed by: ANESTHESIOLOGY

## 2021-12-03 DEVICE — SCREW STD PED 6.5 X 45MM: Type: IMPLANTABLE DEVICE | Site: BACK | Status: FUNCTIONAL

## 2021-12-03 DEVICE — IMPLANTABLE DEVICE: Type: IMPLANTABLE DEVICE | Site: BACK | Status: FUNCTIONAL

## 2021-12-03 DEVICE — SCREW SET SPINE: Type: IMPLANTABLE DEVICE | Site: BACK | Status: FUNCTIONAL

## 2021-12-03 DEVICE — IMP CELLULAR GRAFT MOLDABLE LARGE: Type: IMPLANTABLE DEVICE | Site: BACK | Status: FUNCTIONAL

## 2021-12-03 DEVICE — SCEW STD PED 6.5 X 40MM: Type: IMPLANTABLE DEVICE | Site: BACK | Status: FUNCTIONAL

## 2021-12-03 RX ORDER — VANCOMYCIN HYDROCHLORIDE 1 G/20ML
INJECTION, POWDER, LYOPHILIZED, FOR SOLUTION INTRAVENOUS
Status: DISCONTINUED | OUTPATIENT
Start: 2021-12-03 | End: 2021-12-03 | Stop reason: HOSPADM

## 2021-12-03 RX ORDER — CLINDAMYCIN PHOSPHATE 900 MG/50ML
INJECTION, SOLUTION INTRAVENOUS
Status: DISCONTINUED | OUTPATIENT
Start: 2021-12-03 | End: 2021-12-03

## 2021-12-03 RX ORDER — DEXAMETHASONE SODIUM PHOSPHATE 4 MG/ML
INJECTION, SOLUTION INTRA-ARTICULAR; INTRALESIONAL; INTRAMUSCULAR; INTRAVENOUS; SOFT TISSUE
Status: DISCONTINUED | OUTPATIENT
Start: 2021-12-03 | End: 2021-12-03

## 2021-12-03 RX ORDER — PROPOFOL 10 MG/ML
VIAL (ML) INTRAVENOUS
Status: DISCONTINUED | OUTPATIENT
Start: 2021-12-03 | End: 2021-12-03

## 2021-12-03 RX ORDER — SODIUM CHLORIDE 9 MG/ML
INJECTION, SOLUTION INTRAVENOUS CONTINUOUS
Status: DISCONTINUED | OUTPATIENT
Start: 2021-12-03 | End: 2021-12-04

## 2021-12-03 RX ORDER — EPINEPHRINE CONVENIENCE KIT 1 MG/ML(1)
KIT INTRAMUSCULAR; SUBCUTANEOUS
Status: DISCONTINUED | OUTPATIENT
Start: 2021-12-03 | End: 2021-12-03 | Stop reason: HOSPADM

## 2021-12-03 RX ORDER — MEPERIDINE HYDROCHLORIDE 25 MG/ML
25 INJECTION INTRAMUSCULAR; INTRAVENOUS; SUBCUTANEOUS EVERY 10 MIN PRN
Status: DISCONTINUED | OUTPATIENT
Start: 2021-12-03 | End: 2021-12-03 | Stop reason: HOSPADM

## 2021-12-03 RX ORDER — VECURONIUM BROMIDE FOR INJECTION 1 MG/ML
INJECTION, POWDER, LYOPHILIZED, FOR SOLUTION INTRAVENOUS
Status: DISCONTINUED | OUTPATIENT
Start: 2021-12-03 | End: 2021-12-03

## 2021-12-03 RX ORDER — OXYCODONE HYDROCHLORIDE 5 MG/1
5 TABLET ORAL
Status: DISCONTINUED | OUTPATIENT
Start: 2021-12-03 | End: 2021-12-03 | Stop reason: HOSPADM

## 2021-12-03 RX ORDER — FENTANYL CITRATE 50 UG/ML
INJECTION, SOLUTION INTRAMUSCULAR; INTRAVENOUS
Status: DISCONTINUED | OUTPATIENT
Start: 2021-12-03 | End: 2021-12-03

## 2021-12-03 RX ORDER — PHENYLEPHRINE HYDROCHLORIDE 10 MG/ML
INJECTION INTRAVENOUS
Status: DISCONTINUED | OUTPATIENT
Start: 2021-12-03 | End: 2021-12-03

## 2021-12-03 RX ORDER — ENOXAPARIN SODIUM 100 MG/ML
40 INJECTION SUBCUTANEOUS EVERY 24 HOURS
Status: DISCONTINUED | OUTPATIENT
Start: 2021-12-03 | End: 2021-12-07 | Stop reason: HOSPADM

## 2021-12-03 RX ORDER — ROCURONIUM BROMIDE 10 MG/ML
INJECTION, SOLUTION INTRAVENOUS
Status: DISCONTINUED | OUTPATIENT
Start: 2021-12-03 | End: 2021-12-03

## 2021-12-03 RX ORDER — DIPHENHYDRAMINE HYDROCHLORIDE 50 MG/ML
25 INJECTION INTRAMUSCULAR; INTRAVENOUS EVERY 6 HOURS PRN
Status: DISCONTINUED | OUTPATIENT
Start: 2021-12-03 | End: 2021-12-03 | Stop reason: HOSPADM

## 2021-12-03 RX ORDER — TRANEXAMIC ACID 100 MG/ML
INJECTION, SOLUTION INTRAVENOUS
Status: DISCONTINUED | OUTPATIENT
Start: 2021-12-03 | End: 2021-12-03

## 2021-12-03 RX ORDER — MIDAZOLAM HYDROCHLORIDE 1 MG/ML
INJECTION INTRAMUSCULAR; INTRAVENOUS
Status: DISCONTINUED | OUTPATIENT
Start: 2021-12-03 | End: 2021-12-03

## 2021-12-03 RX ORDER — ONDANSETRON 2 MG/ML
4 INJECTION INTRAMUSCULAR; INTRAVENOUS DAILY PRN
Status: DISCONTINUED | OUTPATIENT
Start: 2021-12-03 | End: 2021-12-03 | Stop reason: HOSPADM

## 2021-12-03 RX ORDER — MORPHINE SULFATE 10 MG/ML
4 INJECTION INTRAMUSCULAR; INTRAVENOUS; SUBCUTANEOUS EVERY 5 MIN PRN
Status: DISCONTINUED | OUTPATIENT
Start: 2021-12-03 | End: 2021-12-03 | Stop reason: HOSPADM

## 2021-12-03 RX ORDER — LIDOCAINE HYDROCHLORIDE 20 MG/ML
INJECTION, SOLUTION EPIDURAL; INFILTRATION; INTRACAUDAL; PERINEURAL
Status: DISCONTINUED | OUTPATIENT
Start: 2021-12-03 | End: 2021-12-03

## 2021-12-03 RX ORDER — HYDROMORPHONE HYDROCHLORIDE 2 MG/ML
0.5 INJECTION, SOLUTION INTRAMUSCULAR; INTRAVENOUS; SUBCUTANEOUS EVERY 5 MIN PRN
Status: DISCONTINUED | OUTPATIENT
Start: 2021-12-03 | End: 2021-12-03 | Stop reason: HOSPADM

## 2021-12-03 RX ADMIN — GABAPENTIN 600 MG: 300 CAPSULE ORAL at 09:12

## 2021-12-03 RX ADMIN — OXYCODONE HYDROCHLORIDE 10 MG: 5 TABLET ORAL at 05:12

## 2021-12-03 RX ADMIN — PHENYLEPHRINE HYDROCHLORIDE 200 MCG: 10 INJECTION INTRAVENOUS at 10:12

## 2021-12-03 RX ADMIN — PROPOFOL 130 MG: 10 INJECTION, EMULSION INTRAVENOUS at 10:12

## 2021-12-03 RX ADMIN — PHENYLEPHRINE HYDROCHLORIDE 100 MCG: 10 INJECTION INTRAVENOUS at 12:12

## 2021-12-03 RX ADMIN — FERROUS GLUCONATE 324 MG: 324 TABLET ORAL at 09:12

## 2021-12-03 RX ADMIN — PANTOPRAZOLE SODIUM 40 MG: 40 TABLET, DELAYED RELEASE ORAL at 09:12

## 2021-12-03 RX ADMIN — IPRATROPIUM BROMIDE AND ALBUTEROL SULFATE 3 ML: .5; 3 SOLUTION RESPIRATORY (INHALATION) at 07:12

## 2021-12-03 RX ADMIN — CARVEDILOL 25 MG: 25 TABLET, FILM COATED ORAL at 09:12

## 2021-12-03 RX ADMIN — CLINDAMYCIN IN 5 PERCENT DEXTROSE 900 MG: 18 INJECTION, SOLUTION INTRAVENOUS at 10:12

## 2021-12-03 RX ADMIN — LEVOTHYROXINE SODIUM 75 MCG: 75 TABLET ORAL at 04:12

## 2021-12-03 RX ADMIN — TRANEXAMIC ACID 1000 MG: 100 INJECTION, SOLUTION INTRAVENOUS at 01:12

## 2021-12-03 RX ADMIN — ACETAMINOPHEN 500 MG: 500 TABLET ORAL at 05:12

## 2021-12-03 RX ADMIN — MUPIROCIN: 20 OINTMENT TOPICAL at 09:12

## 2021-12-03 RX ADMIN — SODIUM CHLORIDE: 9 INJECTION, SOLUTION INTRAVENOUS at 09:12

## 2021-12-03 RX ADMIN — LIDOCAINE HYDROCHLORIDE 75 MG: 20 INJECTION, SOLUTION EPIDURAL; INFILTRATION; INTRACAUDAL; PERINEURAL at 10:12

## 2021-12-03 RX ADMIN — SUGAMMADEX 200 MG: 100 INJECTION, SOLUTION INTRAVENOUS at 02:12

## 2021-12-03 RX ADMIN — GABAPENTIN 600 MG: 300 CAPSULE ORAL at 04:12

## 2021-12-03 RX ADMIN — MYCOPHENOLATE MOFETIL 500 MG: 250 CAPSULE ORAL at 09:12

## 2021-12-03 RX ADMIN — TIZANIDINE 4 MG: 4 TABLET ORAL at 04:12

## 2021-12-03 RX ADMIN — VECURONIUM BROMIDE 5 MG: 1 INJECTION, POWDER, LYOPHILIZED, FOR SOLUTION INTRAVENOUS at 11:12

## 2021-12-03 RX ADMIN — ROCURONIUM BROMIDE 50 MG: 10 INJECTION, SOLUTION INTRAVENOUS at 10:12

## 2021-12-03 RX ADMIN — AMITRIPTYLINE HYDROCHLORIDE 25 MG: 25 TABLET, FILM COATED ORAL at 09:12

## 2021-12-03 RX ADMIN — FENTANYL CITRATE 100 MCG: 50 INJECTION INTRAMUSCULAR; INTRAVENOUS at 10:12

## 2021-12-03 RX ADMIN — CLINDAMYCIN PHOSPHATE 600 MG: 600 INJECTION, SOLUTION INTRAVENOUS at 03:12

## 2021-12-03 RX ADMIN — MIDAZOLAM HYDROCHLORIDE 2 MG: 1 INJECTION, SOLUTION INTRAMUSCULAR; INTRAVENOUS at 10:12

## 2021-12-03 RX ADMIN — PHENYLEPHRINE HYDROCHLORIDE 200 MCG: 10 INJECTION INTRAVENOUS at 01:12

## 2021-12-03 RX ADMIN — OXYCODONE HYDROCHLORIDE 5 MG: 5 TABLET ORAL at 03:12

## 2021-12-03 RX ADMIN — MORPHINE SULFATE 4 MG: 10 INJECTION INTRAVENOUS at 02:12

## 2021-12-03 RX ADMIN — PHENYLEPHRINE HYDROCHLORIDE 100 MCG: 10 INJECTION INTRAVENOUS at 11:12

## 2021-12-03 RX ADMIN — DEXAMETHASONE SODIUM PHOSPHATE 20 MG: 4 INJECTION, SOLUTION INTRA-ARTICULAR; INTRALESIONAL; INTRAMUSCULAR; INTRAVENOUS; SOFT TISSUE at 10:12

## 2021-12-03 RX ADMIN — OXYCODONE HYDROCHLORIDE 5 MG: 5 TABLET ORAL at 09:12

## 2021-12-03 RX ADMIN — TIZANIDINE 4 MG: 4 TABLET ORAL at 05:12

## 2021-12-03 RX ADMIN — TRANEXAMIC ACID 1000 MG: 100 INJECTION, SOLUTION INTRAVENOUS at 10:12

## 2021-12-03 RX ADMIN — FAMOTIDINE 20 MG: 20 TABLET ORAL at 09:12

## 2021-12-03 RX ADMIN — ONDANSETRON 4 MG: 2 INJECTION INTRAMUSCULAR; INTRAVENOUS at 10:12

## 2021-12-03 RX ADMIN — ACETAMINOPHEN 500 MG: 500 TABLET ORAL at 10:12

## 2021-12-03 RX ADMIN — TOPIRAMATE 25 MG: 25 TABLET, FILM COATED ORAL at 09:12

## 2021-12-03 RX ADMIN — ACETAMINOPHEN 500 MG: 500 TABLET ORAL at 04:12

## 2021-12-03 RX ADMIN — SENNOSIDES AND DOCUSATE SODIUM 1 TABLET: 50; 8.6 TABLET ORAL at 09:12

## 2021-12-03 RX ADMIN — DOCUSATE SODIUM 100 MG: 100 CAPSULE ORAL at 09:12

## 2021-12-03 RX ADMIN — TIZANIDINE 4 MG: 4 TABLET ORAL at 10:12

## 2021-12-03 RX ADMIN — ENOXAPARIN SODIUM 40 MG: 40 INJECTION SUBCUTANEOUS at 04:12

## 2021-12-03 RX ADMIN — MORPHINE SULFATE 2 MG: 10 INJECTION INTRAVENOUS at 02:12

## 2021-12-04 PROCEDURE — 63600175 PHARM REV CODE 636 W HCPCS: Performed by: FAMILY MEDICINE

## 2021-12-04 PROCEDURE — 25000003 PHARM REV CODE 250: Performed by: ORTHOPAEDIC SURGERY

## 2021-12-04 PROCEDURE — 97110 THERAPEUTIC EXERCISES: CPT

## 2021-12-04 PROCEDURE — 94761 N-INVAS EAR/PLS OXIMETRY MLT: CPT

## 2021-12-04 PROCEDURE — 99232 PR SUBSEQUENT HOSPITAL CARE,LEVL II: ICD-10-PCS | Mod: GC,,, | Performed by: FAMILY MEDICINE

## 2021-12-04 PROCEDURE — 25000242 PHARM REV CODE 250 ALT 637 W/ HCPCS: Performed by: HOSPITALIST

## 2021-12-04 PROCEDURE — 11000001 HC ACUTE MED/SURG PRIVATE ROOM

## 2021-12-04 PROCEDURE — 63600175 PHARM REV CODE 636 W HCPCS: Performed by: ORTHOPAEDIC SURGERY

## 2021-12-04 PROCEDURE — 99232 SBSQ HOSP IP/OBS MODERATE 35: CPT | Mod: GC,,, | Performed by: FAMILY MEDICINE

## 2021-12-04 PROCEDURE — 94640 AIRWAY INHALATION TREATMENT: CPT

## 2021-12-04 PROCEDURE — 97116 GAIT TRAINING THERAPY: CPT

## 2021-12-04 RX ORDER — DULOXETIN HYDROCHLORIDE 30 MG/1
60 CAPSULE, DELAYED RELEASE ORAL 2 TIMES DAILY
Status: DISCONTINUED | OUTPATIENT
Start: 2021-12-04 | End: 2021-12-07 | Stop reason: HOSPADM

## 2021-12-04 RX ORDER — PANTOPRAZOLE SODIUM 40 MG/10ML
40 INJECTION, POWDER, LYOPHILIZED, FOR SOLUTION INTRAVENOUS DAILY
Status: DISPENSED | OUTPATIENT
Start: 2021-12-04 | End: 2021-12-05

## 2021-12-04 RX ORDER — OXYCODONE HYDROCHLORIDE 5 MG/1
10 TABLET ORAL
Status: DISCONTINUED | OUTPATIENT
Start: 2021-12-04 | End: 2021-12-05

## 2021-12-04 RX ORDER — PANTOPRAZOLE SODIUM 40 MG/1
40 TABLET, DELAYED RELEASE ORAL 2 TIMES DAILY
Status: DISCONTINUED | OUTPATIENT
Start: 2021-12-05 | End: 2021-12-07 | Stop reason: HOSPADM

## 2021-12-04 RX ORDER — SODIUM CHLORIDE 9 MG/ML
INJECTION, SOLUTION INTRAVENOUS CONTINUOUS
Status: DISCONTINUED | OUTPATIENT
Start: 2021-12-04 | End: 2021-12-07 | Stop reason: HOSPADM

## 2021-12-04 RX ADMIN — POLYETHYLENE GLYCOL 3350 17 G: 17 POWDER, FOR SOLUTION ORAL at 10:12

## 2021-12-04 RX ADMIN — ATORVASTATIN CALCIUM 20 MG: 20 TABLET, FILM COATED ORAL at 10:12

## 2021-12-04 RX ADMIN — SODIUM CHLORIDE: 9 INJECTION, SOLUTION INTRAVENOUS at 01:12

## 2021-12-04 RX ADMIN — PANTOPRAZOLE SODIUM 40 MG: 40 TABLET, DELAYED RELEASE ORAL at 10:12

## 2021-12-04 RX ADMIN — CARVEDILOL 25 MG: 25 TABLET, FILM COATED ORAL at 10:12

## 2021-12-04 RX ADMIN — OXYCODONE HYDROCHLORIDE 10 MG: 5 TABLET ORAL at 05:12

## 2021-12-04 RX ADMIN — OXYCODONE HYDROCHLORIDE 5 MG: 5 TABLET ORAL at 03:12

## 2021-12-04 RX ADMIN — AMLODIPINE BESYLATE 5 MG: 5 TABLET ORAL at 10:12

## 2021-12-04 RX ADMIN — MUPIROCIN: 20 OINTMENT TOPICAL at 08:12

## 2021-12-04 RX ADMIN — MONTELUKAST 10 MG: 10 TABLET, FILM COATED ORAL at 11:12

## 2021-12-04 RX ADMIN — GABAPENTIN 600 MG: 300 CAPSULE ORAL at 10:12

## 2021-12-04 RX ADMIN — ACETAMINOPHEN 500 MG: 500 TABLET ORAL at 05:12

## 2021-12-04 RX ADMIN — MORPHINE SULFATE 2 MG: 2 INJECTION, SOLUTION INTRAMUSCULAR; INTRAVENOUS at 05:12

## 2021-12-04 RX ADMIN — LEVOTHYROXINE SODIUM 75 MCG: 75 TABLET ORAL at 05:12

## 2021-12-04 RX ADMIN — TIZANIDINE 4 MG: 4 TABLET ORAL at 11:12

## 2021-12-04 RX ADMIN — OXYBUTYNIN CHLORIDE 10 MG: 5 TABLET, EXTENDED RELEASE ORAL at 10:12

## 2021-12-04 RX ADMIN — MYCOPHENOLATE MOFETIL 500 MG: 250 CAPSULE ORAL at 08:12

## 2021-12-04 RX ADMIN — DOCUSATE SODIUM 100 MG: 100 CAPSULE ORAL at 10:12

## 2021-12-04 RX ADMIN — GABAPENTIN 600 MG: 300 CAPSULE ORAL at 08:12

## 2021-12-04 RX ADMIN — OXYCODONE HYDROCHLORIDE 10 MG: 5 TABLET ORAL at 03:12

## 2021-12-04 RX ADMIN — ACETAMINOPHEN 500 MG: 500 TABLET ORAL at 03:12

## 2021-12-04 RX ADMIN — VALSARTAN 320 MG: 320 TABLET, FILM COATED ORAL at 10:12

## 2021-12-04 RX ADMIN — FERROUS GLUCONATE 324 MG: 324 TABLET ORAL at 08:12

## 2021-12-04 RX ADMIN — MUPIROCIN: 20 OINTMENT TOPICAL at 10:12

## 2021-12-04 RX ADMIN — TIZANIDINE 4 MG: 4 TABLET ORAL at 07:12

## 2021-12-04 RX ADMIN — TOPIRAMATE 25 MG: 25 TABLET, FILM COATED ORAL at 10:12

## 2021-12-04 RX ADMIN — TIZANIDINE 4 MG: 4 TABLET ORAL at 03:12

## 2021-12-04 RX ADMIN — IPRATROPIUM BROMIDE AND ALBUTEROL SULFATE 3 ML: .5; 3 SOLUTION RESPIRATORY (INHALATION) at 07:12

## 2021-12-04 RX ADMIN — AMITRIPTYLINE HYDROCHLORIDE 25 MG: 25 TABLET, FILM COATED ORAL at 08:12

## 2021-12-04 RX ADMIN — FAMOTIDINE 20 MG: 20 TABLET ORAL at 10:12

## 2021-12-04 RX ADMIN — SENNOSIDES AND DOCUSATE SODIUM 1 TABLET: 50; 8.6 TABLET ORAL at 08:12

## 2021-12-04 RX ADMIN — DULOXETINE 60 MG: 30 CAPSULE, DELAYED RELEASE ORAL at 08:12

## 2021-12-04 RX ADMIN — SODIUM CHLORIDE: 9 INJECTION, SOLUTION INTRAVENOUS at 05:12

## 2021-12-04 RX ADMIN — ACETAMINOPHEN 500 MG: 500 TABLET ORAL at 10:12

## 2021-12-04 RX ADMIN — OXYCODONE HYDROCHLORIDE 5 MG: 5 TABLET ORAL at 10:12

## 2021-12-04 RX ADMIN — OXYCODONE HYDROCHLORIDE 10 MG: 5 TABLET ORAL at 11:12

## 2021-12-04 RX ADMIN — MYCOPHENOLATE MOFETIL 500 MG: 250 CAPSULE ORAL at 10:12

## 2021-12-04 RX ADMIN — DULOXETINE 60 MG: 30 CAPSULE, DELAYED RELEASE ORAL at 10:12

## 2021-12-04 RX ADMIN — FERROUS GLUCONATE 324 MG: 324 TABLET ORAL at 10:12

## 2021-12-04 RX ADMIN — FAMOTIDINE 20 MG: 20 TABLET ORAL at 08:12

## 2021-12-04 RX ADMIN — MORPHINE SULFATE 2 MG: 2 INJECTION, SOLUTION INTRAMUSCULAR; INTRAVENOUS at 11:12

## 2021-12-04 RX ADMIN — FUROSEMIDE 20 MG: 20 TABLET ORAL at 10:12

## 2021-12-04 RX ADMIN — Medication 1000 UNITS: at 10:12

## 2021-12-04 RX ADMIN — ISOSORBIDE MONONITRATE 30 MG: 30 TABLET, EXTENDED RELEASE ORAL at 10:12

## 2021-12-04 RX ADMIN — IPRATROPIUM BROMIDE AND ALBUTEROL SULFATE 3 ML: .5; 3 SOLUTION RESPIRATORY (INHALATION) at 08:12

## 2021-12-04 RX ADMIN — POTASSIUM BICARBONATE 20 MEQ: 391 TABLET, EFFERVESCENT ORAL at 10:12

## 2021-12-04 RX ADMIN — ENOXAPARIN SODIUM 40 MG: 40 INJECTION SUBCUTANEOUS at 05:12

## 2021-12-04 RX ADMIN — GABAPENTIN 600 MG: 300 CAPSULE ORAL at 03:12

## 2021-12-04 RX ADMIN — SENNOSIDES AND DOCUSATE SODIUM 1 TABLET: 50; 8.6 TABLET ORAL at 10:12

## 2021-12-04 RX ADMIN — DOCUSATE SODIUM 100 MG: 100 CAPSULE ORAL at 08:12

## 2021-12-04 RX ADMIN — TOPIRAMATE 25 MG: 25 TABLET, FILM COATED ORAL at 08:12

## 2021-12-05 LAB
ABO + RH BLD: NORMAL
ANION GAP SERPL CALCULATED.3IONS-SCNC: 12 MMOL/L (ref 7–16)
BASOPHILS # BLD AUTO: 0.01 K/UL (ref 0–0.2)
BASOPHILS NFR BLD AUTO: 0.2 % (ref 0–1)
BLD PROD TYP BPU: NORMAL
BLOOD UNIT EXPIRATION DATE: NORMAL
BLOOD UNIT TYPE CODE: 5100
BUN SERPL-MCNC: 9 MG/DL (ref 7–18)
BUN/CREAT SERPL: 15 (ref 6–20)
CALCIUM SERPL-MCNC: 7.6 MG/DL (ref 8.5–10.1)
CHLORIDE SERPL-SCNC: 113 MMOL/L (ref 98–107)
CO2 SERPL-SCNC: 24 MMOL/L (ref 21–32)
CREAT SERPL-MCNC: 0.6 MG/DL (ref 0.55–1.02)
CROSSMATCH INTERPRETATION: NORMAL
DIFFERENTIAL METHOD BLD: ABNORMAL
DISPENSE STATUS: NORMAL
EOSINOPHIL # BLD AUTO: 0.01 K/UL (ref 0–0.5)
EOSINOPHIL NFR BLD AUTO: 0.2 % (ref 1–4)
ERYTHROCYTE [DISTWIDTH] IN BLOOD BY AUTOMATED COUNT: 14.6 % (ref 11.5–14.5)
GLUCOSE SERPL-MCNC: 101 MG/DL (ref 74–106)
HCT VFR BLD AUTO: 21.3 % (ref 38–47)
HGB BLD-MCNC: 6.8 G/DL (ref 12–16)
IMM GRANULOCYTES # BLD AUTO: 0.11 K/UL (ref 0–0.04)
IMM GRANULOCYTES NFR BLD: 1.7 % (ref 0–0.4)
INDIRECT COOMBS: NORMAL
LYMPHOCYTES # BLD AUTO: 1.57 K/UL (ref 1–4.8)
LYMPHOCYTES NFR BLD AUTO: 23.8 % (ref 27–41)
MCH RBC QN AUTO: 25.2 PG (ref 27–31)
MCHC RBC AUTO-ENTMCNC: 31.9 G/DL (ref 32–36)
MCV RBC AUTO: 78.9 FL (ref 80–96)
MONOCYTES # BLD AUTO: 0.88 K/UL (ref 0–0.8)
MONOCYTES NFR BLD AUTO: 13.3 % (ref 2–6)
MPC BLD CALC-MCNC: 10.5 FL (ref 9.4–12.4)
NEUTROPHILS # BLD AUTO: 4.02 K/UL (ref 1.8–7.7)
NEUTROPHILS NFR BLD AUTO: 60.8 % (ref 53–65)
NRBC # BLD AUTO: 0.02 X10E3/UL
NRBC, AUTO (.00): 0.3 %
PLATELET # BLD AUTO: 268 K/UL (ref 150–400)
POTASSIUM SERPL-SCNC: 3.5 MMOL/L (ref 3.5–5.1)
RBC # BLD AUTO: 2.7 M/UL (ref 4.2–5.4)
RH BLD: NORMAL
SODIUM SERPL-SCNC: 145 MMOL/L (ref 136–145)
UNIT NUMBER: NORMAL
WBC # BLD AUTO: 6.6 K/UL (ref 4.5–11)

## 2021-12-05 PROCEDURE — 25000003 PHARM REV CODE 250

## 2021-12-05 PROCEDURE — 25000003 PHARM REV CODE 250: Performed by: ORTHOPAEDIC SURGERY

## 2021-12-05 PROCEDURE — 97116 GAIT TRAINING THERAPY: CPT

## 2021-12-05 PROCEDURE — P9016 RBC LEUKOCYTES REDUCED: HCPCS | Performed by: FAMILY MEDICINE

## 2021-12-05 PROCEDURE — 80048 BASIC METABOLIC PNL TOTAL CA: CPT | Performed by: FAMILY MEDICINE

## 2021-12-05 PROCEDURE — 36415 COLL VENOUS BLD VENIPUNCTURE: CPT | Performed by: FAMILY MEDICINE

## 2021-12-05 PROCEDURE — 99232 PR SUBSEQUENT HOSPITAL CARE,LEVL II: ICD-10-PCS | Mod: GC,,, | Performed by: FAMILY MEDICINE

## 2021-12-05 PROCEDURE — 63600175 PHARM REV CODE 636 W HCPCS: Performed by: ORTHOPAEDIC SURGERY

## 2021-12-05 PROCEDURE — 63600175 PHARM REV CODE 636 W HCPCS: Performed by: FAMILY MEDICINE

## 2021-12-05 PROCEDURE — 25000242 PHARM REV CODE 250 ALT 637 W/ HCPCS: Performed by: HOSPITALIST

## 2021-12-05 PROCEDURE — 86900 BLOOD TYPING SEROLOGIC ABO: CPT | Performed by: FAMILY MEDICINE

## 2021-12-05 PROCEDURE — 97110 THERAPEUTIC EXERCISES: CPT

## 2021-12-05 PROCEDURE — 94761 N-INVAS EAR/PLS OXIMETRY MLT: CPT

## 2021-12-05 PROCEDURE — 99232 SBSQ HOSP IP/OBS MODERATE 35: CPT | Mod: GC,,, | Performed by: FAMILY MEDICINE

## 2021-12-05 PROCEDURE — 94640 AIRWAY INHALATION TREATMENT: CPT

## 2021-12-05 PROCEDURE — 11000001 HC ACUTE MED/SURG PRIVATE ROOM

## 2021-12-05 PROCEDURE — 36415 COLL VENOUS BLD VENIPUNCTURE: CPT | Performed by: ORTHOPAEDIC SURGERY

## 2021-12-05 PROCEDURE — 36430 TRANSFUSION BLD/BLD COMPNT: CPT

## 2021-12-05 PROCEDURE — 85025 COMPLETE CBC W/AUTO DIFF WBC: CPT | Performed by: FAMILY MEDICINE

## 2021-12-05 RX ORDER — OXYCODONE HYDROCHLORIDE 5 MG/1
5 TABLET ORAL
Status: DISCONTINUED | OUTPATIENT
Start: 2021-12-05 | End: 2021-12-05

## 2021-12-05 RX ORDER — OXYCODONE HYDROCHLORIDE 5 MG/1
10 TABLET ORAL
Status: DISCONTINUED | OUTPATIENT
Start: 2021-12-05 | End: 2021-12-05

## 2021-12-05 RX ORDER — KETOROLAC TROMETHAMINE 30 MG/ML
30 INJECTION, SOLUTION INTRAMUSCULAR; INTRAVENOUS ONCE
Status: COMPLETED | OUTPATIENT
Start: 2021-12-05 | End: 2021-12-05

## 2021-12-05 RX ORDER — HYDROCODONE BITARTRATE AND ACETAMINOPHEN 500; 5 MG/1; MG/1
TABLET ORAL
Status: DISCONTINUED | OUTPATIENT
Start: 2021-12-05 | End: 2021-12-07 | Stop reason: HOSPADM

## 2021-12-05 RX ORDER — MORPHINE SULFATE 4 MG/ML
4 INJECTION, SOLUTION INTRAMUSCULAR; INTRAVENOUS
Status: DISCONTINUED | OUTPATIENT
Start: 2021-12-05 | End: 2021-12-07 | Stop reason: HOSPADM

## 2021-12-05 RX ORDER — OXYCODONE HYDROCHLORIDE 5 MG/1
5 TABLET ORAL
Status: DISCONTINUED | OUTPATIENT
Start: 2021-12-05 | End: 2021-12-07 | Stop reason: HOSPADM

## 2021-12-05 RX ADMIN — POLYETHYLENE GLYCOL 3350 17 G: 17 POWDER, FOR SOLUTION ORAL at 08:12

## 2021-12-05 RX ADMIN — PANTOPRAZOLE SODIUM 40 MG: 40 TABLET, DELAYED RELEASE ORAL at 08:12

## 2021-12-05 RX ADMIN — Medication 1000 UNITS: at 08:12

## 2021-12-05 RX ADMIN — FAMOTIDINE 20 MG: 20 TABLET ORAL at 08:12

## 2021-12-05 RX ADMIN — ISOSORBIDE MONONITRATE 30 MG: 30 TABLET, EXTENDED RELEASE ORAL at 09:12

## 2021-12-05 RX ADMIN — TOPIRAMATE 25 MG: 25 TABLET, FILM COATED ORAL at 08:12

## 2021-12-05 RX ADMIN — MORPHINE SULFATE 2 MG: 2 INJECTION, SOLUTION INTRAMUSCULAR; INTRAVENOUS at 02:12

## 2021-12-05 RX ADMIN — DOCUSATE SODIUM 100 MG: 100 CAPSULE ORAL at 08:12

## 2021-12-05 RX ADMIN — SODIUM CHLORIDE: 9 INJECTION, SOLUTION INTRAVENOUS at 02:12

## 2021-12-05 RX ADMIN — SODIUM CHLORIDE: 9 INJECTION, SOLUTION INTRAVENOUS at 11:12

## 2021-12-05 RX ADMIN — FERROUS GLUCONATE 324 MG: 324 TABLET ORAL at 09:12

## 2021-12-05 RX ADMIN — MORPHINE SULFATE 4 MG: 4 INJECTION INTRAVENOUS at 08:12

## 2021-12-05 RX ADMIN — ATORVASTATIN CALCIUM 20 MG: 20 TABLET, FILM COATED ORAL at 09:12

## 2021-12-05 RX ADMIN — OXYBUTYNIN CHLORIDE 10 MG: 5 TABLET, EXTENDED RELEASE ORAL at 08:12

## 2021-12-05 RX ADMIN — GABAPENTIN 600 MG: 300 CAPSULE ORAL at 08:12

## 2021-12-05 RX ADMIN — IPRATROPIUM BROMIDE AND ALBUTEROL SULFATE 3 ML: .5; 3 SOLUTION RESPIRATORY (INHALATION) at 07:12

## 2021-12-05 RX ADMIN — SODIUM CHLORIDE: 9 INJECTION, SOLUTION INTRAVENOUS at 08:12

## 2021-12-05 RX ADMIN — VALSARTAN 320 MG: 320 TABLET, FILM COATED ORAL at 09:12

## 2021-12-05 RX ADMIN — TIZANIDINE 4 MG: 4 TABLET ORAL at 03:12

## 2021-12-05 RX ADMIN — LEVOTHYROXINE SODIUM 75 MCG: 75 TABLET ORAL at 06:12

## 2021-12-05 RX ADMIN — ACETAMINOPHEN 500 MG: 500 TABLET ORAL at 05:12

## 2021-12-05 RX ADMIN — MYCOPHENOLATE MOFETIL 500 MG: 250 CAPSULE ORAL at 08:12

## 2021-12-05 RX ADMIN — OXYCODONE HYDROCHLORIDE 10 MG: 5 TABLET ORAL at 04:12

## 2021-12-05 RX ADMIN — KETOROLAC TROMETHAMINE 30 MG: 30 INJECTION, SOLUTION INTRAMUSCULAR; INTRAVENOUS at 09:12

## 2021-12-05 RX ADMIN — MUPIROCIN: 20 OINTMENT TOPICAL at 12:12

## 2021-12-05 RX ADMIN — ACETAMINOPHEN 500 MG: 500 TABLET ORAL at 03:12

## 2021-12-05 RX ADMIN — ACETAMINOPHEN 500 MG: 500 TABLET ORAL at 06:12

## 2021-12-05 RX ADMIN — CARVEDILOL 25 MG: 25 TABLET, FILM COATED ORAL at 09:12

## 2021-12-05 RX ADMIN — OXYCODONE HYDROCHLORIDE 5 MG: 5 TABLET ORAL at 11:12

## 2021-12-05 RX ADMIN — DULOXETINE 60 MG: 30 CAPSULE, DELAYED RELEASE ORAL at 08:12

## 2021-12-05 RX ADMIN — FERROUS GLUCONATE 324 MG: 324 TABLET ORAL at 08:12

## 2021-12-05 RX ADMIN — MONTELUKAST 10 MG: 10 TABLET, FILM COATED ORAL at 08:12

## 2021-12-05 RX ADMIN — SENNOSIDES AND DOCUSATE SODIUM 1 TABLET: 50; 8.6 TABLET ORAL at 08:12

## 2021-12-05 RX ADMIN — MUPIROCIN: 20 OINTMENT TOPICAL at 08:12

## 2021-12-05 RX ADMIN — SENNOSIDES AND DOCUSATE SODIUM 1 TABLET: 50; 8.6 TABLET ORAL at 09:12

## 2021-12-05 RX ADMIN — AMITRIPTYLINE HYDROCHLORIDE 25 MG: 25 TABLET, FILM COATED ORAL at 08:12

## 2021-12-05 RX ADMIN — OXYCODONE HYDROCHLORIDE 5 MG: 5 TABLET ORAL at 03:12

## 2021-12-05 RX ADMIN — TIZANIDINE 4 MG: 4 TABLET ORAL at 05:12

## 2021-12-05 RX ADMIN — ENOXAPARIN SODIUM 40 MG: 40 INJECTION SUBCUTANEOUS at 06:12

## 2021-12-05 RX ADMIN — GABAPENTIN 600 MG: 300 CAPSULE ORAL at 03:12

## 2021-12-05 RX ADMIN — AMLODIPINE BESYLATE 5 MG: 5 TABLET ORAL at 09:12

## 2021-12-05 RX ADMIN — POTASSIUM BICARBONATE 20 MEQ: 391 TABLET, EFFERVESCENT ORAL at 09:12

## 2021-12-05 RX ADMIN — ACETAMINOPHEN 500 MG: 500 TABLET ORAL at 12:12

## 2021-12-05 RX ADMIN — FUROSEMIDE 20 MG: 20 TABLET ORAL at 08:12

## 2021-12-06 LAB
ANION GAP SERPL CALCULATED.3IONS-SCNC: 13 MMOL/L (ref 7–16)
BASOPHILS # BLD AUTO: 0.03 K/UL (ref 0–0.2)
BASOPHILS NFR BLD AUTO: 0.4 % (ref 0–1)
BUN SERPL-MCNC: 6 MG/DL (ref 7–18)
BUN/CREAT SERPL: 12 (ref 6–20)
CALCIUM SERPL-MCNC: 8.1 MG/DL (ref 8.5–10.1)
CHLORIDE SERPL-SCNC: 110 MMOL/L (ref 98–107)
CO2 SERPL-SCNC: 23 MMOL/L (ref 21–32)
CREAT SERPL-MCNC: 0.52 MG/DL (ref 0.55–1.02)
DIFFERENTIAL METHOD BLD: ABNORMAL
EOSINOPHIL # BLD AUTO: 0.11 K/UL (ref 0–0.5)
EOSINOPHIL NFR BLD AUTO: 1.6 % (ref 1–4)
ERYTHROCYTE [DISTWIDTH] IN BLOOD BY AUTOMATED COUNT: 14.9 % (ref 11.5–14.5)
GLUCOSE SERPL-MCNC: 102 MG/DL (ref 74–106)
HCT VFR BLD AUTO: 33.5 % (ref 38–47)
HGB BLD-MCNC: 9.7 G/DL (ref 12–16)
IMM GRANULOCYTES # BLD AUTO: 0.17 K/UL (ref 0–0.04)
IMM GRANULOCYTES NFR BLD: 2.4 % (ref 0–0.4)
LYMPHOCYTES # BLD AUTO: 0.96 K/UL (ref 1–4.8)
LYMPHOCYTES NFR BLD AUTO: 13.8 % (ref 27–41)
MCH RBC QN AUTO: 25.3 PG (ref 27–31)
MCHC RBC AUTO-ENTMCNC: 29 G/DL (ref 32–36)
MCV RBC AUTO: 87.2 FL (ref 80–96)
MONOCYTES # BLD AUTO: 0.76 K/UL (ref 0–0.8)
MONOCYTES NFR BLD AUTO: 10.9 % (ref 2–6)
MPC BLD CALC-MCNC: 10 FL (ref 9.4–12.4)
NEUTROPHILS # BLD AUTO: 4.93 K/UL (ref 1.8–7.7)
NEUTROPHILS NFR BLD AUTO: 70.9 % (ref 53–65)
NRBC # BLD AUTO: 0.03 X10E3/UL
NRBC, AUTO (.00): 0.4 %
PLATELET # BLD AUTO: 333 K/UL (ref 150–400)
POTASSIUM SERPL-SCNC: 3.5 MMOL/L (ref 3.5–5.1)
RBC # BLD AUTO: 3.84 M/UL (ref 4.2–5.4)
SODIUM SERPL-SCNC: 142 MMOL/L (ref 136–145)
WBC # BLD AUTO: 6.96 K/UL (ref 4.5–11)

## 2021-12-06 PROCEDURE — 80048 BASIC METABOLIC PNL TOTAL CA: CPT | Performed by: FAMILY MEDICINE

## 2021-12-06 PROCEDURE — 97116 GAIT TRAINING THERAPY: CPT

## 2021-12-06 PROCEDURE — 25000003 PHARM REV CODE 250: Performed by: ORTHOPAEDIC SURGERY

## 2021-12-06 PROCEDURE — 63600175 PHARM REV CODE 636 W HCPCS: Performed by: FAMILY MEDICINE

## 2021-12-06 PROCEDURE — 94640 AIRWAY INHALATION TREATMENT: CPT

## 2021-12-06 PROCEDURE — 63600175 PHARM REV CODE 636 W HCPCS: Performed by: ORTHOPAEDIC SURGERY

## 2021-12-06 PROCEDURE — 27000221 HC OXYGEN, UP TO 24 HOURS

## 2021-12-06 PROCEDURE — 11000001 HC ACUTE MED/SURG PRIVATE ROOM

## 2021-12-06 PROCEDURE — 99232 SBSQ HOSP IP/OBS MODERATE 35: CPT | Mod: GC,,, | Performed by: FAMILY MEDICINE

## 2021-12-06 PROCEDURE — 36430 TRANSFUSION BLD/BLD COMPNT: CPT

## 2021-12-06 PROCEDURE — 25000003 PHARM REV CODE 250

## 2021-12-06 PROCEDURE — 99900035 HC TECH TIME PER 15 MIN (STAT)

## 2021-12-06 PROCEDURE — 36415 COLL VENOUS BLD VENIPUNCTURE: CPT | Performed by: FAMILY MEDICINE

## 2021-12-06 PROCEDURE — 94761 N-INVAS EAR/PLS OXIMETRY MLT: CPT

## 2021-12-06 PROCEDURE — 85025 COMPLETE CBC W/AUTO DIFF WBC: CPT | Performed by: FAMILY MEDICINE

## 2021-12-06 PROCEDURE — 99232 PR SUBSEQUENT HOSPITAL CARE,LEVL II: ICD-10-PCS | Mod: GC,,, | Performed by: FAMILY MEDICINE

## 2021-12-06 PROCEDURE — 25000242 PHARM REV CODE 250 ALT 637 W/ HCPCS: Performed by: HOSPITALIST

## 2021-12-06 RX ORDER — HYDRALAZINE HYDROCHLORIDE 20 MG/ML
10 INJECTION INTRAMUSCULAR; INTRAVENOUS EVERY 6 HOURS PRN
Status: DISCONTINUED | OUTPATIENT
Start: 2021-12-06 | End: 2021-12-07 | Stop reason: HOSPADM

## 2021-12-06 RX ADMIN — VALSARTAN 320 MG: 320 TABLET, FILM COATED ORAL at 09:12

## 2021-12-06 RX ADMIN — AMITRIPTYLINE HYDROCHLORIDE 25 MG: 25 TABLET, FILM COATED ORAL at 08:12

## 2021-12-06 RX ADMIN — GABAPENTIN 600 MG: 300 CAPSULE ORAL at 09:12

## 2021-12-06 RX ADMIN — OXYCODONE HYDROCHLORIDE 5 MG: 5 TABLET ORAL at 11:12

## 2021-12-06 RX ADMIN — PANTOPRAZOLE SODIUM 40 MG: 40 TABLET, DELAYED RELEASE ORAL at 08:12

## 2021-12-06 RX ADMIN — TOPIRAMATE 25 MG: 25 TABLET, FILM COATED ORAL at 08:12

## 2021-12-06 RX ADMIN — ACETAMINOPHEN 500 MG: 500 TABLET ORAL at 09:12

## 2021-12-06 RX ADMIN — GABAPENTIN 600 MG: 300 CAPSULE ORAL at 03:12

## 2021-12-06 RX ADMIN — MORPHINE SULFATE 4 MG: 4 INJECTION INTRAVENOUS at 04:12

## 2021-12-06 RX ADMIN — IPRATROPIUM BROMIDE AND ALBUTEROL SULFATE 3 ML: .5; 3 SOLUTION RESPIRATORY (INHALATION) at 07:12

## 2021-12-06 RX ADMIN — MUPIROCIN: 20 OINTMENT TOPICAL at 08:12

## 2021-12-06 RX ADMIN — FERROUS GLUCONATE 324 MG: 324 TABLET ORAL at 08:12

## 2021-12-06 RX ADMIN — DOCUSATE SODIUM 100 MG: 100 CAPSULE ORAL at 09:12

## 2021-12-06 RX ADMIN — ACETAMINOPHEN 500 MG: 500 TABLET ORAL at 05:12

## 2021-12-06 RX ADMIN — TOPIRAMATE 25 MG: 25 TABLET, FILM COATED ORAL at 09:12

## 2021-12-06 RX ADMIN — ACETAMINOPHEN 500 MG: 500 TABLET ORAL at 06:12

## 2021-12-06 RX ADMIN — MUPIROCIN: 20 OINTMENT TOPICAL at 09:12

## 2021-12-06 RX ADMIN — SODIUM CHLORIDE: 9 INJECTION, SOLUTION INTRAVENOUS at 01:12

## 2021-12-06 RX ADMIN — MONTELUKAST 10 MG: 10 TABLET, FILM COATED ORAL at 09:12

## 2021-12-06 RX ADMIN — OXYCODONE HYDROCHLORIDE 5 MG: 5 TABLET ORAL at 08:12

## 2021-12-06 RX ADMIN — SENNOSIDES AND DOCUSATE SODIUM 1 TABLET: 50; 8.6 TABLET ORAL at 09:12

## 2021-12-06 RX ADMIN — MORPHINE SULFATE 4 MG: 4 INJECTION INTRAVENOUS at 12:12

## 2021-12-06 RX ADMIN — FAMOTIDINE 20 MG: 20 TABLET ORAL at 09:12

## 2021-12-06 RX ADMIN — MYCOPHENOLATE MOFETIL 500 MG: 250 CAPSULE ORAL at 08:12

## 2021-12-06 RX ADMIN — POLYETHYLENE GLYCOL 3350 17 G: 17 POWDER, FOR SOLUTION ORAL at 09:12

## 2021-12-06 RX ADMIN — OXYBUTYNIN CHLORIDE 10 MG: 5 TABLET, EXTENDED RELEASE ORAL at 09:12

## 2021-12-06 RX ADMIN — CARVEDILOL 25 MG: 25 TABLET, FILM COATED ORAL at 09:12

## 2021-12-06 RX ADMIN — TIZANIDINE 4 MG: 4 TABLET ORAL at 01:12

## 2021-12-06 RX ADMIN — ONDANSETRON 4 MG: 2 INJECTION INTRAMUSCULAR; INTRAVENOUS at 11:12

## 2021-12-06 RX ADMIN — FUROSEMIDE 20 MG: 20 TABLET ORAL at 09:12

## 2021-12-06 RX ADMIN — ENOXAPARIN SODIUM 40 MG: 40 INJECTION SUBCUTANEOUS at 05:12

## 2021-12-06 RX ADMIN — MYCOPHENOLATE MOFETIL 500 MG: 250 CAPSULE ORAL at 09:12

## 2021-12-06 RX ADMIN — GABAPENTIN 600 MG: 300 CAPSULE ORAL at 08:12

## 2021-12-06 RX ADMIN — FAMOTIDINE 20 MG: 20 TABLET ORAL at 08:12

## 2021-12-06 RX ADMIN — ACETAMINOPHEN 500 MG: 500 TABLET ORAL at 01:12

## 2021-12-06 RX ADMIN — POTASSIUM BICARBONATE 20 MEQ: 391 TABLET, EFFERVESCENT ORAL at 09:12

## 2021-12-06 RX ADMIN — DULOXETINE 60 MG: 30 CAPSULE, DELAYED RELEASE ORAL at 09:12

## 2021-12-06 RX ADMIN — LEVOTHYROXINE SODIUM 75 MCG: 75 TABLET ORAL at 05:12

## 2021-12-06 RX ADMIN — FERROUS GLUCONATE 324 MG: 324 TABLET ORAL at 09:12

## 2021-12-06 RX ADMIN — TIZANIDINE 4 MG: 4 TABLET ORAL at 09:12

## 2021-12-06 RX ADMIN — DOCUSATE SODIUM 100 MG: 100 CAPSULE ORAL at 08:12

## 2021-12-06 RX ADMIN — SENNOSIDES AND DOCUSATE SODIUM 1 TABLET: 50; 8.6 TABLET ORAL at 08:12

## 2021-12-06 RX ADMIN — OXYCODONE HYDROCHLORIDE 5 MG: 5 TABLET ORAL at 09:12

## 2021-12-06 RX ADMIN — DULOXETINE 60 MG: 30 CAPSULE, DELAYED RELEASE ORAL at 08:12

## 2021-12-06 RX ADMIN — ATORVASTATIN CALCIUM 20 MG: 20 TABLET, FILM COATED ORAL at 09:12

## 2021-12-06 RX ADMIN — Medication 1000 UNITS: at 09:12

## 2021-12-06 RX ADMIN — TIZANIDINE 4 MG: 4 TABLET ORAL at 06:12

## 2021-12-06 RX ADMIN — ACETAMINOPHEN 500 MG: 500 TABLET ORAL at 02:12

## 2021-12-06 RX ADMIN — ISOSORBIDE MONONITRATE 30 MG: 30 TABLET, EXTENDED RELEASE ORAL at 09:12

## 2021-12-06 RX ADMIN — PANTOPRAZOLE SODIUM 40 MG: 40 TABLET, DELAYED RELEASE ORAL at 09:12

## 2021-12-06 RX ADMIN — AMLODIPINE BESYLATE 5 MG: 5 TABLET ORAL at 09:12

## 2021-12-07 VITALS
DIASTOLIC BLOOD PRESSURE: 69 MMHG | SYSTOLIC BLOOD PRESSURE: 136 MMHG | HEIGHT: 66 IN | RESPIRATION RATE: 18 BRPM | WEIGHT: 154.31 LBS | OXYGEN SATURATION: 96 % | TEMPERATURE: 99 F | HEART RATE: 88 BPM | BODY MASS INDEX: 24.8 KG/M2

## 2021-12-07 PROCEDURE — 25000003 PHARM REV CODE 250

## 2021-12-07 PROCEDURE — 25000003 PHARM REV CODE 250: Performed by: ORTHOPAEDIC SURGERY

## 2021-12-07 PROCEDURE — 25000242 PHARM REV CODE 250 ALT 637 W/ HCPCS: Performed by: HOSPITALIST

## 2021-12-07 PROCEDURE — 94640 AIRWAY INHALATION TREATMENT: CPT

## 2021-12-07 PROCEDURE — 94761 N-INVAS EAR/PLS OXIMETRY MLT: CPT

## 2021-12-07 PROCEDURE — 99900035 HC TECH TIME PER 15 MIN (STAT)

## 2021-12-07 RX ADMIN — OXYCODONE HYDROCHLORIDE 5 MG: 5 TABLET ORAL at 04:12

## 2021-12-07 RX ADMIN — ACETAMINOPHEN 500 MG: 500 TABLET ORAL at 05:12

## 2021-12-07 RX ADMIN — TIZANIDINE 4 MG: 4 TABLET ORAL at 05:12

## 2021-12-07 RX ADMIN — IPRATROPIUM BROMIDE AND ALBUTEROL SULFATE 3 ML: .5; 3 SOLUTION RESPIRATORY (INHALATION) at 07:12

## 2021-12-08 ENCOUNTER — OFFICE VISIT (OUTPATIENT)
Dept: PAIN MEDICINE | Facility: CLINIC | Age: 71
End: 2021-12-08
Payer: COMMERCIAL

## 2021-12-08 DIAGNOSIS — M47.817 LUMBOSACRAL SPONDYLOSIS WITHOUT MYELOPATHY: Primary | Chronic | ICD-10-CM

## 2021-12-08 DIAGNOSIS — M32.9 LUPUS: Chronic | ICD-10-CM

## 2021-12-08 PROCEDURE — 4010F ACE/ARB THERAPY RXD/TAKEN: CPT | Mod: 95,CPTII,, | Performed by: PHYSICIAN ASSISTANT

## 2021-12-08 PROCEDURE — 99442 PR PHYSICIAN TELEPHONE EVALUATION 11-20 MIN: ICD-10-PCS | Mod: 95,,, | Performed by: PHYSICIAN ASSISTANT

## 2021-12-08 PROCEDURE — 4010F PR ACE/ARB THEARPY RXD/TAKEN: ICD-10-PCS | Mod: 95,CPTII,, | Performed by: PHYSICIAN ASSISTANT

## 2021-12-08 PROCEDURE — 99442 PR PHYSICIAN TELEPHONE EVALUATION 11-20 MIN: CPT | Mod: 95,,, | Performed by: PHYSICIAN ASSISTANT

## 2021-12-08 RX ORDER — HYDROCODONE BITARTRATE AND ACETAMINOPHEN 10; 325 MG/1; MG/1
1 TABLET ORAL EVERY 6 HOURS
Qty: 120 TABLET | Refills: 0 | Status: SHIPPED | OUTPATIENT
Start: 2021-12-10 | End: 2022-01-07 | Stop reason: SDUPTHER

## 2021-12-08 RX ORDER — GABAPENTIN 600 MG/1
600 TABLET ORAL EVERY 8 HOURS
Qty: 90 TABLET | Refills: 2 | Status: SHIPPED | OUTPATIENT
Start: 2021-12-08 | End: 2021-12-28 | Stop reason: DRUGHIGH

## 2021-12-08 RX ORDER — AMITRIPTYLINE HYDROCHLORIDE 25 MG/1
25 TABLET, FILM COATED ORAL NIGHTLY
Qty: 30 TABLET | Refills: 0 | Status: SHIPPED | OUTPATIENT
Start: 2021-12-08 | End: 2022-01-07 | Stop reason: SDUPTHER

## 2021-12-08 RX ORDER — DULOXETIN HYDROCHLORIDE 30 MG/1
30 CAPSULE, DELAYED RELEASE ORAL DAILY
Qty: 30 CAPSULE | Refills: 0 | Status: SHIPPED | OUTPATIENT
Start: 2021-12-08 | End: 2022-01-07 | Stop reason: SDUPTHER

## 2021-12-13 ENCOUNTER — TELEPHONE (OUTPATIENT)
Dept: SPINE | Facility: CLINIC | Age: 71
End: 2021-12-13
Payer: COMMERCIAL

## 2021-12-13 DIAGNOSIS — Z98.890 STATUS POST LUMBAR SPINE OPERATION: Primary | ICD-10-CM

## 2021-12-14 DIAGNOSIS — Z09 FOLLOW-UP SURGERY CARE: ICD-10-CM

## 2021-12-14 DIAGNOSIS — M47.817 LUMBOSACRAL SPONDYLOSIS WITHOUT MYELOPATHY: Primary | ICD-10-CM

## 2021-12-15 ENCOUNTER — HOSPITAL ENCOUNTER (OUTPATIENT)
Dept: RADIOLOGY | Facility: HOSPITAL | Age: 71
Discharge: HOME | End: 2021-12-15
Attending: ORTHOPAEDIC SURGERY
Payer: COMMERCIAL

## 2021-12-15 ENCOUNTER — OFFICE VISIT (OUTPATIENT)
Dept: SPINE | Facility: CLINIC | Age: 71
End: 2021-12-15
Payer: COMMERCIAL

## 2021-12-15 DIAGNOSIS — M54.16 LUMBAR RADICULOPATHY: ICD-10-CM

## 2021-12-15 DIAGNOSIS — M25.551 RIGHT HIP PAIN: ICD-10-CM

## 2021-12-15 DIAGNOSIS — G95.9 CERVICAL MYELOPATHY: ICD-10-CM

## 2021-12-15 DIAGNOSIS — M51.36 DDD (DEGENERATIVE DISC DISEASE), LUMBAR: ICD-10-CM

## 2021-12-15 DIAGNOSIS — M50.30 DDD (DEGENERATIVE DISC DISEASE), CERVICAL: ICD-10-CM

## 2021-12-15 DIAGNOSIS — Z09 FOLLOW-UP SURGERY CARE: ICD-10-CM

## 2021-12-15 DIAGNOSIS — M25.551 RIGHT HIP PAIN: Primary | ICD-10-CM

## 2021-12-15 PROCEDURE — 99024 PR POST-OP FOLLOW-UP VISIT: ICD-10-PCS | Mod: ,,, | Performed by: ORTHOPAEDIC SURGERY

## 2021-12-15 PROCEDURE — 99024 POSTOP FOLLOW-UP VISIT: CPT | Mod: ,,, | Performed by: ORTHOPAEDIC SURGERY

## 2021-12-15 PROCEDURE — 4010F PR ACE/ARB THEARPY RXD/TAKEN: ICD-10-PCS | Mod: CPTII,,, | Performed by: ORTHOPAEDIC SURGERY

## 2021-12-15 PROCEDURE — 73502 X-RAY EXAM HIP UNI 2-3 VIEWS: CPT | Mod: 26,RT,, | Performed by: ORTHOPAEDIC SURGERY

## 2021-12-15 PROCEDURE — 72100 XR LUMBAR SPINE AP AND LATERAL: ICD-10-PCS | Mod: 26,,, | Performed by: ORTHOPAEDIC SURGERY

## 2021-12-15 PROCEDURE — 73502 X-RAY EXAM HIP UNI 2-3 VIEWS: CPT | Mod: TC,RT

## 2021-12-15 PROCEDURE — 4010F ACE/ARB THERAPY RXD/TAKEN: CPT | Mod: CPTII,,, | Performed by: ORTHOPAEDIC SURGERY

## 2021-12-15 PROCEDURE — 72100 X-RAY EXAM L-S SPINE 2/3 VWS: CPT | Mod: 26,,, | Performed by: ORTHOPAEDIC SURGERY

## 2021-12-15 PROCEDURE — 99214 OFFICE O/P EST MOD 30 MIN: CPT | Mod: PBBFAC | Performed by: ORTHOPAEDIC SURGERY

## 2021-12-15 PROCEDURE — 72100 X-RAY EXAM L-S SPINE 2/3 VWS: CPT | Mod: TC

## 2021-12-15 PROCEDURE — 73502 XR HIP WITH PELVIS WHEN PERFORMED, 2 OR 3  VIEWS RIGHT: ICD-10-PCS | Mod: 26,RT,, | Performed by: ORTHOPAEDIC SURGERY

## 2021-12-15 RX ORDER — GABAPENTIN 800 MG/1
800 TABLET ORAL 3 TIMES DAILY
Qty: 90 TABLET | Refills: 11 | Status: SHIPPED | OUTPATIENT
Start: 2021-12-15 | End: 2022-04-05

## 2021-12-22 ENCOUNTER — HOSPITAL ENCOUNTER (OUTPATIENT)
Dept: RADIOLOGY | Facility: HOSPITAL | Age: 71
Discharge: HOME | End: 2021-12-22
Attending: ORTHOPAEDIC SURGERY
Payer: COMMERCIAL

## 2021-12-22 ENCOUNTER — OFFICE VISIT (OUTPATIENT)
Dept: SPINE | Facility: CLINIC | Age: 71
End: 2021-12-22
Payer: COMMERCIAL

## 2021-12-22 DIAGNOSIS — M43.16 SPONDYLOLISTHESIS, LUMBAR REGION: ICD-10-CM

## 2021-12-22 DIAGNOSIS — M50.30 DDD (DEGENERATIVE DISC DISEASE), CERVICAL: ICD-10-CM

## 2021-12-22 DIAGNOSIS — M51.36 DDD (DEGENERATIVE DISC DISEASE), LUMBAR: ICD-10-CM

## 2021-12-22 DIAGNOSIS — Z09 FOLLOW-UP EXAMINATION: ICD-10-CM

## 2021-12-22 DIAGNOSIS — M54.16 LUMBAR RADICULOPATHY, CHRONIC: Primary | ICD-10-CM

## 2021-12-22 PROCEDURE — 99215 OFFICE O/P EST HI 40 MIN: CPT | Mod: PBBFAC | Performed by: ORTHOPAEDIC SURGERY

## 2021-12-22 PROCEDURE — 72100 X-RAY EXAM L-S SPINE 2/3 VWS: CPT | Mod: 26,,, | Performed by: ORTHOPAEDIC SURGERY

## 2021-12-22 PROCEDURE — 4010F ACE/ARB THERAPY RXD/TAKEN: CPT | Mod: CPTII,,, | Performed by: ORTHOPAEDIC SURGERY

## 2021-12-22 PROCEDURE — 4010F PR ACE/ARB THEARPY RXD/TAKEN: ICD-10-PCS | Mod: CPTII,,, | Performed by: ORTHOPAEDIC SURGERY

## 2021-12-22 PROCEDURE — 72100 X-RAY EXAM L-S SPINE 2/3 VWS: CPT | Mod: TC

## 2021-12-22 PROCEDURE — 99024 POSTOP FOLLOW-UP VISIT: CPT | Mod: ,,, | Performed by: ORTHOPAEDIC SURGERY

## 2021-12-22 PROCEDURE — 72100 XR LUMBAR SPINE AP AND LATERAL: ICD-10-PCS | Mod: 26,,, | Performed by: ORTHOPAEDIC SURGERY

## 2021-12-22 PROCEDURE — 99024 PR POST-OP FOLLOW-UP VISIT: ICD-10-PCS | Mod: ,,, | Performed by: ORTHOPAEDIC SURGERY

## 2021-12-28 ENCOUNTER — HOSPITAL ENCOUNTER (OUTPATIENT)
Dept: RADIOLOGY | Facility: HOSPITAL | Age: 71
Discharge: HOME | End: 2021-12-28
Attending: ORTHOPAEDIC SURGERY
Payer: COMMERCIAL

## 2021-12-28 VITALS
RESPIRATION RATE: 16 BRPM | SYSTOLIC BLOOD PRESSURE: 133 MMHG | DIASTOLIC BLOOD PRESSURE: 78 MMHG | TEMPERATURE: 97 F | HEART RATE: 91 BPM

## 2021-12-28 DIAGNOSIS — M54.16 LUMBAR RADICULOPATHY, CHRONIC: ICD-10-CM

## 2021-12-28 DIAGNOSIS — Z09 FOLLOW-UP EXAMINATION: ICD-10-CM

## 2021-12-28 PROCEDURE — 25000003 PHARM REV CODE 250: Performed by: ORTHOPAEDIC SURGERY

## 2021-12-28 PROCEDURE — 72132 CT LUMBAR SPINE WITH CONTRAST: ICD-10-PCS | Mod: 26,,, | Performed by: RADIOLOGY

## 2021-12-28 PROCEDURE — 25500020 PHARM REV CODE 255: Performed by: ORTHOPAEDIC SURGERY

## 2021-12-28 PROCEDURE — 27201268 FL MYELOGRAM LUMBAR WITH CT TO FOLLOW

## 2021-12-28 PROCEDURE — 72132 CT LUMBAR SPINE W/DYE: CPT | Mod: TC

## 2021-12-28 PROCEDURE — 72132 CT LUMBAR SPINE W/DYE: CPT | Mod: 26,,, | Performed by: RADIOLOGY

## 2021-12-28 RX ORDER — SODIUM CHLORIDE 450 MG/100ML
INJECTION, SOLUTION INTRAVENOUS ONCE
Status: DISCONTINUED | OUTPATIENT
Start: 2021-12-28 | End: 2021-12-29 | Stop reason: HOSPADM

## 2021-12-28 RX ORDER — DIAZEPAM 5 MG/1
5 TABLET ORAL ONCE
Status: DISCONTINUED | OUTPATIENT
Start: 2021-12-28 | End: 2021-12-29 | Stop reason: HOSPADM

## 2021-12-28 RX ORDER — OXYCODONE AND ACETAMINOPHEN 5; 325 MG/1; MG/1
1 TABLET ORAL EVERY 4 HOURS PRN
Status: DISCONTINUED | OUTPATIENT
Start: 2021-12-28 | End: 2021-12-29 | Stop reason: HOSPADM

## 2021-12-28 RX ORDER — IOPAMIDOL 408 MG/ML
13 INJECTION, SOLUTION INTRATHECAL
Status: COMPLETED | OUTPATIENT
Start: 2021-12-28 | End: 2021-12-28

## 2021-12-28 RX ADMIN — IOPAMIDOL 13 ML: 408 INJECTION, SOLUTION INTRATHECAL at 10:12

## 2021-12-28 RX ADMIN — OXYCODONE HYDROCHLORIDE AND ACETAMINOPHEN 1 TABLET: 5; 325 TABLET ORAL at 10:12

## 2021-12-29 ENCOUNTER — HOSPITAL ENCOUNTER (OUTPATIENT)
Dept: RADIOLOGY | Facility: HOSPITAL | Age: 71
Discharge: HOME | End: 2021-12-29
Attending: NURSE PRACTITIONER
Payer: COMMERCIAL

## 2021-12-29 ENCOUNTER — OFFICE VISIT (OUTPATIENT)
Dept: SPINE | Facility: CLINIC | Age: 71
End: 2021-12-29
Payer: COMMERCIAL

## 2021-12-29 DIAGNOSIS — Z09 FOLLOW-UP EXAMINATION: ICD-10-CM

## 2021-12-29 DIAGNOSIS — Z09 FOLLOW-UP EXAMINATION: Primary | ICD-10-CM

## 2021-12-29 DIAGNOSIS — M25.561 ACUTE PAIN OF RIGHT KNEE: ICD-10-CM

## 2021-12-29 DIAGNOSIS — M25.559 HIP PAIN: ICD-10-CM

## 2021-12-29 DIAGNOSIS — S82.141S CLOSED FRACTURE OF RIGHT TIBIAL PLATEAU, SEQUELA: Primary | ICD-10-CM

## 2021-12-29 PROCEDURE — 99024 PR POST-OP FOLLOW-UP VISIT: ICD-10-PCS | Mod: ,,, | Performed by: NURSE PRACTITIONER

## 2021-12-29 PROCEDURE — 99215 OFFICE O/P EST HI 40 MIN: CPT | Mod: PBBFAC | Performed by: NURSE PRACTITIONER

## 2021-12-29 PROCEDURE — 73502 X-RAY EXAM HIP UNI 2-3 VIEWS: CPT | Mod: 26,RT,, | Performed by: STUDENT IN AN ORGANIZED HEALTH CARE EDUCATION/TRAINING PROGRAM

## 2021-12-29 PROCEDURE — 4010F PR ACE/ARB THEARPY RXD/TAKEN: ICD-10-PCS | Mod: CPTII,,, | Performed by: NURSE PRACTITIONER

## 2021-12-29 PROCEDURE — 72100 X-RAY EXAM L-S SPINE 2/3 VWS: CPT | Mod: 26,,, | Performed by: RADIOLOGY

## 2021-12-29 PROCEDURE — 73502 X-RAY EXAM HIP UNI 2-3 VIEWS: CPT | Mod: TC,RT

## 2021-12-29 PROCEDURE — 72100 XR LUMBAR SPINE AP AND LATERAL: ICD-10-PCS | Mod: 26,,, | Performed by: RADIOLOGY

## 2021-12-29 PROCEDURE — 73560 X-RAY EXAM OF KNEE 1 OR 2: CPT | Mod: 26,RT,, | Performed by: STUDENT IN AN ORGANIZED HEALTH CARE EDUCATION/TRAINING PROGRAM

## 2021-12-29 PROCEDURE — 72100 X-RAY EXAM L-S SPINE 2/3 VWS: CPT | Mod: TC

## 2021-12-29 PROCEDURE — 73502 XR HIP WITH PELVIS WHEN PERFORMED, 2 OR 3  VIEWS RIGHT: ICD-10-PCS | Mod: 26,RT,, | Performed by: STUDENT IN AN ORGANIZED HEALTH CARE EDUCATION/TRAINING PROGRAM

## 2021-12-29 PROCEDURE — 1159F MED LIST DOCD IN RCRD: CPT | Mod: CPTII,,, | Performed by: NURSE PRACTITIONER

## 2021-12-29 PROCEDURE — 4010F ACE/ARB THERAPY RXD/TAKEN: CPT | Mod: CPTII,,, | Performed by: NURSE PRACTITIONER

## 2021-12-29 PROCEDURE — 3044F HG A1C LEVEL LT 7.0%: CPT | Mod: CPTII,,, | Performed by: NURSE PRACTITIONER

## 2021-12-29 PROCEDURE — 1159F PR MEDICATION LIST DOCUMENTED IN MEDICAL RECORD: ICD-10-PCS | Mod: CPTII,,, | Performed by: NURSE PRACTITIONER

## 2021-12-29 PROCEDURE — 99024 POSTOP FOLLOW-UP VISIT: CPT | Mod: ,,, | Performed by: NURSE PRACTITIONER

## 2021-12-29 PROCEDURE — 73560 XR KNEE 1 OR 2 VIEW RIGHT: ICD-10-PCS | Mod: 26,RT,, | Performed by: STUDENT IN AN ORGANIZED HEALTH CARE EDUCATION/TRAINING PROGRAM

## 2021-12-29 PROCEDURE — 3044F PR MOST RECENT HEMOGLOBIN A1C LEVEL <7.0%: ICD-10-PCS | Mod: CPTII,,, | Performed by: NURSE PRACTITIONER

## 2021-12-29 PROCEDURE — 73560 X-RAY EXAM OF KNEE 1 OR 2: CPT | Mod: TC,RT

## 2022-01-04 ENCOUNTER — OFFICE VISIT (OUTPATIENT)
Dept: ORTHOPEDICS | Facility: CLINIC | Age: 72
End: 2022-01-04
Payer: COMMERCIAL

## 2022-01-04 DIAGNOSIS — S82.141A: Primary | ICD-10-CM

## 2022-01-04 DIAGNOSIS — M17.11 PRIMARY OSTEOARTHRITIS OF RIGHT KNEE: ICD-10-CM

## 2022-01-04 PROCEDURE — 1159F MED LIST DOCD IN RCRD: CPT | Mod: CPTII,,, | Performed by: ORTHOPAEDIC SURGERY

## 2022-01-04 PROCEDURE — 1111F DSCHRG MED/CURRENT MED MERGE: CPT | Mod: CPTII,,, | Performed by: ORTHOPAEDIC SURGERY

## 2022-01-04 PROCEDURE — 27530 PR CLOSED RX TIBIAL PLATEAU FX: ICD-10-PCS | Mod: 79,RT,, | Performed by: ORTHOPAEDIC SURGERY

## 2022-01-04 PROCEDURE — 27530 TREAT KNEE FRACTURE: CPT | Mod: 79,RT,, | Performed by: ORTHOPAEDIC SURGERY

## 2022-01-04 PROCEDURE — 99214 OFFICE O/P EST MOD 30 MIN: CPT | Mod: 24,57,, | Performed by: ORTHOPAEDIC SURGERY

## 2022-01-04 PROCEDURE — 99214 PR OFFICE/OUTPT VISIT, EST, LEVL IV, 30-39 MIN: ICD-10-PCS | Mod: 24,57,, | Performed by: ORTHOPAEDIC SURGERY

## 2022-01-04 PROCEDURE — 1159F PR MEDICATION LIST DOCUMENTED IN MEDICAL RECORD: ICD-10-PCS | Mod: CPTII,,, | Performed by: ORTHOPAEDIC SURGERY

## 2022-01-04 PROCEDURE — 1111F PR DISCHARGE MEDS RECONCILED W/ CURRENT OUTPATIENT MED LIST: ICD-10-PCS | Mod: CPTII,,, | Performed by: ORTHOPAEDIC SURGERY

## 2022-01-04 NOTE — PROGRESS NOTES
ASSESSMENT:      ICD-10-CM ICD-9-CM   1. Posterior tibial plateau fracture, right, closed, initial encounter  S82.141A 823.00   2. Primary osteoarthritis of right knee  M17.11 715.16       PLAN:     Findings and treatment options were discussed with the patient regarding the diagnosis.   All questions were answered regarding Megan Cervantes 's painful knee.    This appears to be a subacute fracture and I would not recommend surgical intervention.  Will treat this conservatively with a knee immobilizer and repeat radiographs in 4 weeks.  Recommend nonweightbearing.  Will see back in 4 weeks time.  Natural history and expected course discussed. Questions answered. Educational materials distributed. Rest, ice, compression, and elevation (RICE) therapy.    There are no Patient Instructions on file for this visit.    IMAGING:   X-Ray Lumbar Spine AP And Lateral    Result Date: 12/29/2021  EXAMINATION: XR LUMBAR SPINE AP AND LATERAL CLINICAL HISTORY: .  Encounter for follow-up examination after completed treatment for conditions other than malignant neoplasm TECHNIQUE: Lumbar spine AP and lateral three views COMPARISON: December 28, 2021 myelogram FINDINGS: Pedicular screws and rods fuse the L3 through S1 vertebral bodies with stable satisfactory alignment.  There is also intervertebral hardware at L3-L4 and L4-L5. There is no acute fracture.  There is mild central depression of the superior endplate of L1 as before. Presumed gastric stimulator overlies the anterior lower abdomen. There is a large amount of stool in the colon, suggesting constipation     No significant change from the previous study.  Postsurgical changes as before Large amount of stool in the colon may reflect constipation Electronically signed by: Luke Araujo Date:    12/29/2021 Time:    12:51    X-Ray Lumbar Spine AP And Lateral    Result Date: 12/22/2021  See Procedure Notes for results. IMPRESSION: Please see Ortho procedure notes  for report.  This procedure was auto-finalized by: Virtual Radiologist    X-Ray Lumbar Spine AP And Lateral    Result Date: 12/15/2021  See Procedure Notes for results. IMPRESSION: Please see Ortho procedure notes for report.  This procedure was auto-finalized by: Virtual Radiologist    X-Ray Hip 2 or 3 views Right (with Pelvis when performed)    Result Date: 12/29/2021  EXAMINATION: XR HIP WITH PELVIS WHEN PERFORMED, 2 OR 3  VIEWS RIGHT CLINICAL HISTORY: Pain in unspecified hip TECHNIQUE: XR HIP WITH PELVIS WHEN PERFORMED, 2 OR 3  VIEWS RIGHT COMPARISON: Comparisons were reviewed, if available. FINDINGS: No acute fracture or dislocation. Moderate degenerative change of the hips. No radiopaque foreign bodies.     As above. Electronically signed by: Ubaldo Christy Date:    12/29/2021 Time:    12:49    X-Ray Hip 2 or 3 views Right (with Pelvis when performed)    Result Date: 12/15/2021  See Procedure Notes for results. IMPRESSION: Please see Ortho procedure notes for report.  This procedure was auto-finalized by: Virtual Radiologist    X-Ray Knee 1 or 2 View Right    Result Date: 12/29/2021  EXAMINATION: XR KNEE 1 OR 2 VIEW RIGHT CLINICAL HISTORY: Pain in right knee TECHNIQUE: XR KNEE 1 OR 2 VIEW RIGHT COMPARISON: Comparisons were reviewed, if available. FINDINGS: Mildly comminuted fracture posterior tibial plateau.  There is at least 0.8 cm of step-off. Severe tricompartmental osteoarthrosis No radiopaque foreign bodies.     As above. Electronically signed by: Ubaldo Christy Date:    12/29/2021 Time:    12:50      CT Knee Without Contrast Right    Result Date: 1/5/2022  EXAMINATION: CT KNEE WITHOUT CONTRAST RIGHT CLINICAL HISTORY: Knee trauma, tibial plateau fracture (Age >= 5y);Displaced bicondylar fracture of right tibia, sequela TECHNIQUE: CT KNEE WITHOUT CONTRAST RIGHT COMPARISON: Comparisons were reviewed, if available. FINDINGS: No acute fracture or dislocation. Severe tricompartmental osteoarthrosis. There  is sclerosis of the lateral tibial plateau, series 4, image 35.  This could represent nondisplaced insufficiency fracture or perhaps related to long-standing degenerative change. Small knee joint effusion.  Subcutaneous soft tissue swelling.     No acute fracture or dislocation. There is sclerosis of the lateral tibial plateau, series 4, image 35.  This could represent subacute to chronic nondisplaced insufficiency fracture or perhaps related to long-standing degenerative change. Electronically signed by: Ubaldo Christy Date:    01/05/2022 Time:    16:02    FL Myelogram Lumbar with CT to Follow    Result Date: 12/28/2021  EXAMINATION: FL MYELOGRAM LUMBAR WITH CT TO FOLLOW CLINICAL HISTORY: Encounter for follow-up examination after completed treatment for conditions other than malignant neoplasm TECHNIQUE: Fluoroscopically guided lumbar spine myelogram COMPARISON: CT of the lumbar spine FLUORO TIME: As below FINDINGS: Lumbar myelogram Performed by A Sukh RRA Supervised by Dr. AMRITA Christy A formal timeout was called all staff present agree patient procedure. The lower back was prepped with Betadine and sterile field was established.  1% lidocaine was used as local anesthetic.  Under fluoroscopic guidance a 22 gauge spinal needle was placed at the L2-L3 interspace.  13 cc of Isovue 200 M was injected into the intrathecal sac.  The needle was removed and the puncture site was cleaned and bandaged.  The patient tolerated the procedure well there were no immediate postprocedure complications. Total fluoroscopy time was 1 minutes 37 seconds.  Patient will be transferred to CT scan for post-injection scanning.     Successful lumbar spine myelogram Electronically signed by: Ubaldo Christy Date:    12/28/2021 Time:    10:09        CC: Knee pain    71 y.o. Female who presents as a new patient to me for evaluation of  right knee pain.    Occupation: Retired  Pain has been present since Christmas Eve 2021.  Injury  description Patient reports history of back and neck surgeries as recent as December 2nd and 3rd 2021.She says she had a fall on the right knee.   Mechanical symptoms, such as clicking, locking, and popping are not present.    Swelling and effusions  are present.   The patient does  describe symptoms of knee instability, such as the knee buckling and the knee giving way.   Symptoms are worsened with activity.  Better with rest. Treatment thus far has included rest, activity modifications, and oral medications.    she  has not had formal physical therapy.  she has not had prior injections injections into the knee.   she has not had previous advanced imaging such as MRI.     Here today to discuss diagnosis and treatment options.           REVIEW OF SYSTEMS:   Constitution: Negative. Negative for chills, fever and night sweats.    Hematologic/Lymphatic: Negative for bleeding problem. Does not bruise/bleed easily.   Skin: Negative for dry skin, itching and rash.   Musculoskeletal: Negative for falls. Positive for knee pain and muscle weakness.     All other review of symptoms were reviewed and found to be noncontributory.     PAST MEDICAL HISTORY:   Past Medical History:   Diagnosis Date    Achalasia     Asthma     COPD (chronic obstructive pulmonary disease)     Digestive disorder     Gastroesophageal reflux disease without esophagitis 9/21/2020    Gastroparesis 9/21/2020    Hyperlipidemia     Hypertension     Lupus     Mitral valve prolapse     Radiculopathy, cervical region 9/7/2021    Scleroderma     Scoliosis     Thyroid disease        PAST SURGICAL HISTORY:   Past Surgical History:   Procedure Laterality Date    ANTERIOR CERVICAL DISCECTOMY W/ FUSION N/A 9/7/2021    Procedure: C3-C4 Anterior cervical discectomy and fusion;  Surgeon: Lucas Rome MD;  Location: [facility];  Service: Neurosurgery;  Laterality: N/A;    BACK SURGERY      FUSION, SPINE, LATERAL APPROACH N/A 12/2/2021     Procedure: L3-L5 lateral interbody fusion;  Surgeon: Lucas Rome MD;  Location: [facility] OR;  Service: Neurosurgery;  Laterality: N/A;    GASTRIC STIMULATOR IMPLANT SURGERY      LUMBAR LAMINECTOMY WITH FUSION N/A 12/3/2021    Procedure: L3-S1 laminectomy and fusion and L5-S1 interbody fusion;  Surgeon: Lucas Rome MD;  Location: [facility] OR;  Service: Neurosurgery;  Laterality: N/A;    THYROIDECTOMY      TUBAL LIGATION         FAMILY HISTORY:   Family History   Problem Relation Age of Onset    Cancer Mother     Diabetes Brother        SOCIAL HISTORY:   Social History     Socioeconomic History    Marital status:    Tobacco Use    Smoking status: Never Smoker    Smokeless tobacco: Never Used   Substance and Sexual Activity    Alcohol use: Never    Drug use: Never       MEDICATIONS:     Current Outpatient Medications:     albuterol (PROVENTIL/VENTOLIN HFA) 90 mcg/actuation inhaler, Inhale 2 puffs into the lungs every 6 (six) hours as needed for Wheezing. Rescue, Disp: , Rfl:     albuterol-ipratropium (DUO-NEB) 2.5 mg-0.5 mg/3 mL nebulizer solution, Inhale 3 mLs into the lungs every 4 (four) hours as needed., Disp: , Rfl:     amitriptyline (ELAVIL) 25 MG tablet, Take 1 tablet (25 mg total) by mouth every evening., Disp: 30 tablet, Rfl: 0    amLODIPine (NORVASC) 10 MG tablet, Take 10 mg by mouth., Disp: , Rfl:     aspirin (ECOTRIN) 81 MG EC tablet, Take 81 mg by mouth., Disp: , Rfl:     budesonide-glycopyr-formoterol (BREZTRI AEROSPHERE) 160-9-4.8 mcg/actuation HFAA, Inhale into the lungs., Disp: , Rfl:     carvediloL (COREG) 25 MG tablet, 25 mg 2 (two) times daily., Disp: , Rfl:     ciprofloxacin HCl (CIPRO) 500 MG tablet, , Disp: , Rfl:     DULoxetine (CYMBALTA) 30 MG capsule, Take 1 capsule (30 mg total) by mouth once daily., Disp: 30 capsule, Rfl: 0    ferrous gluconate (FERGON) 324 MG tablet, Take 324 mg by mouth 2 (two) times a day., Disp: , Rfl:     furosemide (LASIX) 20 MG  tablet, Take 20 mg by mouth once daily., Disp: , Rfl:     gabapentin (NEURONTIN) 800 MG tablet, Take 1 tablet (800 mg total) by mouth 3 (three) times daily., Disp: 90 tablet, Rfl: 11    HYDROcodone-acetaminophen (NORCO)  mg per tablet, Take 1 tablet by mouth every 6 (six) hours., Disp: 120 tablet, Rfl: 0    isosorbide dinitrate (ISORDIL) 10 MG tablet, Take 10 mg by mouth., Disp: , Rfl:     isosorbide mononitrate (IMDUR) 30 MG 24 hr tablet, 30 mg once daily., Disp: , Rfl:     lansoprazole (PREVACID) 30 MG capsule, , Disp: , Rfl:     levothyroxine (SYNTHROID) 75 MCG tablet, Take 75 mcg by mouth Daily., Disp: , Rfl:     lisinopriL 10 MG tablet, Take 10 mg by mouth., Disp: , Rfl:     mirabegron (MYRBETRIQ) 25 mg Tb24 ER tablet, Take by mouth., Disp: , Rfl:     montelukast (SINGULAIR) 10 mg tablet, Take 10 mg by mouth once daily., Disp: , Rfl:     mycophenolate (CELLCEPT) 500 mg Tab, , Disp: , Rfl:     MYRBETRIQ 50 mg Tb24, , Disp: , Rfl:     nitroGLYCERIN (NITROSTAT) 0.4 MG SL tablet, Place 0.4 mg under the tongue every 5 (five) minutes as needed., Disp: , Rfl:     olmesartan (BENICAR) 40 MG tablet, 40 mg once daily., Disp: , Rfl:     oxyCODONE-acetaminophen (PERCOCET) 5-325 mg per tablet, Take 1 tablet by mouth every 4 (four) hours as needed for Pain., Disp: 45 tablet, Rfl: 0    oxyCODONE-acetaminophen (PERCOCET) 5-325 mg per tablet, Take 1 tablet by mouth every 4 (four) hours as needed for Pain., Disp: 45 tablet, Rfl: 0    potassium chloride (KLOR-CON) 20 mEq Pack, Take 20 mEq by mouth 2 (two) times daily., Disp: , Rfl:     promethazine (PHENERGAN) 25 MG tablet, Take 1 tablet (25 mg total) by mouth every 6 (six) hours as needed for Nausea., Disp: 45 tablet, Rfl: 0    promethazine (PHENERGAN) 25 MG tablet, Take 1 tablet (25 mg total) by mouth every 6 (six) hours as needed for Nausea., Disp: 45 tablet, Rfl: 0    QUEtiapine (SEROQUEL) 25 MG Tab, Take 25 mg by mouth as needed., Disp: , Rfl:      simvastatin (ZOCOR) 40 MG tablet, Take 40 mg by mouth., Disp: , Rfl:     vitamin D (VITAMIN D3) 1000 units Tab, Take 1,000 Units by mouth once daily., Disp: , Rfl:     ALLERGIES:   Review of patient's allergies indicates:   Allergen Reactions    Strawberry Itching and Swelling     Other reaction(s): Unknown    Hydroxyzine hcl      Other reaction(s): Unknown    Iodine      Other reaction(s): Unknown    Iodine and iodide containing products     Penicillin v potassium      Other reaction(s): Unknown    Penicillins     Pineapple     Pregabalin      Other reaction(s): Unknown    Shellfish containing products      Other reaction(s): Unknown    Aspirin Nausea And Vomiting     May take coated aspirin    Latex, natural rubber Rash        PHYSICAL EXAMINATION:  There were no vitals taken for this visit.  General    Nursing note and vitals reviewed.  Constitutional: She is oriented to person, place, and time. She appears well-developed and well-nourished.   HENT:   Head: Normocephalic and atraumatic.   Nose: Nose normal.   Eyes: Pupils are equal, round, and reactive to light.   Neck: Neck supple.   Cardiovascular: Normal rate, regular rhythm and intact distal pulses.    Pulmonary/Chest: Effort normal. No respiratory distress. She exhibits no tenderness.   Abdominal: Soft. She exhibits no distension. There is no abdominal tenderness.   Neurological: She is alert and oriented to person, place, and time. She has normal reflexes.   Psychiatric: She has a normal mood and affect. Her behavior is normal. Judgment and thought content normal.     General Musculoskeletal Exam   Gait: antalgic       Right Knee Exam     Inspection   Swelling: present  Effusion: present  Deformity: present    Tenderness   The patient is tender to palpation of the medial joint line.    Crepitus   The patient has crepitus of the patella and medial joint line.    Range of Motion   Extension: abnormal   Flexion: abnormal     Tests   Meniscus    Saul:  Medial - positive   Ligament Examination Lachman: normal (-1 to 2mm) PCL-Posterior Drawer: normal (0 to 2mm)     MCL - Valgus: normal (0 to 2mm)  LCL - Varus: normalPivot Shift: normal (Equal)Reverse Pivot Shift: normal (Equal)Dial Test at 30 degrees: normal (< 5 degrees)Dial Test at 90 degrees: normal (< 5 degrees)  Posterior Sag Test: negative  Posterolateral Corner: unstable (>15 degrees difference)  Patella   Patellar Tracking: normal  Q-Angle at 90 degrees: normal  Patellar Grind: positive    Other   Sensation: normal    Muscle Strength   Right Lower Extremity   Quadriceps:  5/5   Hamstrin/5     Reflexes     Right Side   Quadriceps:  2+    Vascular Exam     Right Pulses  Dorsalis Pedis:      2+  Posterior Tibial:      2+              No orders of the defined types were placed in this encounter.      Procedures

## 2022-01-05 ENCOUNTER — HOSPITAL ENCOUNTER (OUTPATIENT)
Dept: RADIOLOGY | Facility: HOSPITAL | Age: 72
Discharge: HOME | End: 2022-01-05
Attending: NURSE PRACTITIONER
Payer: COMMERCIAL

## 2022-01-05 DIAGNOSIS — S82.141S CLOSED FRACTURE OF RIGHT TIBIAL PLATEAU, SEQUELA: ICD-10-CM

## 2022-01-05 PROCEDURE — 73700 CT KNEE WITHOUT CONTRAST RIGHT: ICD-10-PCS | Mod: 26,RT,, | Performed by: STUDENT IN AN ORGANIZED HEALTH CARE EDUCATION/TRAINING PROGRAM

## 2022-01-05 PROCEDURE — 73700 CT LOWER EXTREMITY W/O DYE: CPT | Mod: TC,RT

## 2022-01-05 PROCEDURE — 73700 CT LOWER EXTREMITY W/O DYE: CPT | Mod: 26,RT,, | Performed by: STUDENT IN AN ORGANIZED HEALTH CARE EDUCATION/TRAINING PROGRAM

## 2022-01-07 NOTE — PROGRESS NOTES
Disclaimer:  This note has been generated using voice recognition software.  There may be type of graft focal areas that have been missed during a proof reading      Subjective:      Patient ID: Megan Cervantes is a 71 y.o. female.    Chief Complaint: Low-back Pain      Pain  This is a chronic problem. The current episode started more than 1 year ago. The problem occurs daily. The problem has been unchanged. Associated symptoms include neck pain. Pertinent negatives include no change in bowel habit, chest pain, chills, coughing, diaphoresis, fever, rash, sore throat, vertigo or vomiting.     Review of Systems   Constitutional: Negative for activity change, appetite change, chills, diaphoresis, fever and unexpected weight change.   HENT: Negative for drooling, ear discharge, ear pain, facial swelling, nosebleeds, sore throat, trouble swallowing, voice change and goiter.    Eyes: Negative for photophobia, pain, discharge, redness and visual disturbance.   Respiratory: Negative for apnea, cough, choking, chest tightness, shortness of breath, wheezing and stridor.    Cardiovascular: Negative for chest pain, palpitations and leg swelling.   Gastrointestinal: Negative for abdominal distention, change in bowel habit, diarrhea, rectal pain, vomiting, fecal incontinence and change in bowel habit.   Endocrine: Negative for cold intolerance, heat intolerance, polydipsia, polyphagia and polyuria.   Genitourinary: Negative for bladder incontinence, dysuria, flank pain, frequency and hot flashes.   Musculoskeletal: Positive for back pain, leg pain, neck pain and neck stiffness.   Integumentary:  Negative for color change, pallor and rash.   Allergic/Immunologic: Negative for immunocompromised state.   Neurological: Negative for dizziness, vertigo, seizures, syncope, facial asymmetry, speech difficulty, light-headedness, disturbances in coordination, memory loss and coordination difficulties.   Hematological: Negative for  "adenopathy. Does not bruise/bleed easily.   Psychiatric/Behavioral: Negative for agitation, behavioral problems, confusion, decreased concentration, dysphoric mood, hallucinations, self-injury and suicidal ideas. The patient is not nervous/anxious and is not hyperactive.             Objective:  Vitals:    01/11/22 1356   BP: (!) 147/60   Pulse: (!) 121   Resp: 20   Weight: 70.3 kg (155 lb)   Height: 5' 6" (1.676 m)   PainSc: 10-Worst pain ever         Physical Exam  Vitals and nursing note reviewed. Exam conducted with a chaperone present.   Constitutional:       General: She is awake.      Appearance: Normal appearance. She is not ill-appearing or toxic-appearing.   HENT:      Head: Normocephalic and atraumatic.      Nose: Nose normal.      Mouth/Throat:      Mouth: Mucous membranes are moist.      Pharynx: Oropharynx is clear.   Eyes:      Conjunctiva/sclera: Conjunctivae normal.      Pupils: Pupils are equal, round, and reactive to light.   Cardiovascular:      Rate and Rhythm: Normal rate.   Pulmonary:      Effort: Pulmonary effort is normal. No respiratory distress.   Abdominal:      Palpations: Abdomen is soft.   Musculoskeletal:         General: Normal range of motion.      Cervical back: Normal range of motion and neck supple.   Skin:     General: Skin is warm and dry.   Neurological:      General: No focal deficit present.      Mental Status: She is alert and oriented to person, place, and time. Mental status is at baseline.      Cranial Nerves: Cranial nerves are intact. No cranial nerve deficit (II-XII).   Psychiatric:         Mood and Affect: Mood normal.         Behavior: Behavior normal. Behavior is cooperative.         Thought Content: Thought content normal.           Orders Placed This Encounter   Procedures    POCT Urine Drug Screen Presump     Interpretive Information:     Negative:  No drug detected at the cut off level.   Positive:  This result represents presumptive positive for the   tested " drug, other substances may yield a positive response other   than the analyte of interest. This result should be utilized for   diagnostic purpose only. Confirmation testing will be performed upon physician request only.           CT Knee Without Contrast Right  Narrative: EXAMINATION:  CT KNEE WITHOUT CONTRAST RIGHT    CLINICAL HISTORY:  Knee trauma, tibial plateau fracture (Age >= 5y);Displaced bicondylar fracture of right tibia, sequela    TECHNIQUE:  CT KNEE WITHOUT CONTRAST RIGHT    COMPARISON:  Comparisons were reviewed, if available.    FINDINGS:  No acute fracture or dislocation.    Severe tricompartmental osteoarthrosis.    There is sclerosis of the lateral tibial plateau, series 4, image 35.  This could represent nondisplaced insufficiency fracture or perhaps related to long-standing degenerative change.    Small knee joint effusion.  Subcutaneous soft tissue swelling.  Impression: No acute fracture or dislocation.    There is sclerosis of the lateral tibial plateau, series 4, image 35.  This could represent subacute to chronic nondisplaced insufficiency fracture or perhaps related to long-standing degenerative change.    Electronically signed by: Ubaldo Christy  Date:    01/05/2022  Time:    16:02       Admission on 12/02/2021, Discharged on 12/07/2021   Component Date Value Ref Range Status    Sodium 12/03/2021 138  136 - 145 mmol/L Final    Potassium 12/03/2021 4.1  3.5 - 5.1 mmol/L Final    Chloride 12/03/2021 107  98 - 107 mmol/L Final    CO2 12/03/2021 24  21 - 32 mmol/L Final    Anion Gap 12/03/2021 11  7 - 16 mmol/L Final    Glucose 12/03/2021 121* 74 - 106 mg/dL Final    BUN 12/03/2021 10  7 - 18 mg/dL Final    Creatinine 12/03/2021 0.71  0.55 - 1.02 mg/dL Final    BUN/Creatinine Ratio 12/03/2021 14  6 - 20 Final    Calcium 12/03/2021 8.9  8.5 - 10.1 mg/dL Final    eGFR  12/03/2021 105  >=60 mL/min/1.73m² Final    WBC 12/03/2021 11.57* 4.50 - 11.00 K/uL Final    RBC  12/03/2021 3.83* 4.20 - 5.40 M/uL Final    Hemoglobin 12/03/2021 9.3* 12.0 - 16.0 g/dL Final    Hematocrit 12/03/2021 31.4* 38.0 - 47.0 % Final    MCV 12/03/2021 82.0  80.0 - 96.0 fL Final    MCH 12/03/2021 24.3* 27.0 - 31.0 pg Final    MCHC 12/03/2021 29.6* 32.0 - 36.0 g/dL Final    RDW 12/03/2021 14.4  11.5 - 14.5 % Final    Platelet Count 12/03/2021 383  150 - 400 K/uL Final    MPV 12/03/2021 10.8  9.4 - 12.4 fL Final    Neutrophils % 12/03/2021 85.4* 53.0 - 65.0 % Final    Lymphocytes % 12/03/2021 6.7* 27.0 - 41.0 % Final    Monocytes % 12/03/2021 6.7* 2.0 - 6.0 % Final    Eosinophils % 12/03/2021 0.0* 1.0 - 4.0 % Final    Basophils % 12/03/2021 0.1  0.0 - 1.0 % Final    Immature Granulocytes % 12/03/2021 1.1* 0.0 - 0.4 % Final    nRBC, Auto 12/03/2021 0.0  <=0.0 % Final    Neutrophils, Abs 12/03/2021 9.87* 1.80 - 7.70 K/uL Final    Lymphocytes, Absolute 12/03/2021 0.78* 1.00 - 4.80 K/uL Final    Monocytes, Absolute 12/03/2021 0.78  0.00 - 0.80 K/uL Final    Eosinophils, Absolute 12/03/2021 0.00  0.00 - 0.50 K/uL Final    Basophils, Absolute 12/03/2021 0.01  0.00 - 0.20 K/uL Final    Immature Granulocytes, Absolute 12/03/2021 0.13* 0.00 - 0.04 K/uL Final    nRBC, Absolute 12/03/2021 0.00  <=0.00 x10e3/uL Final    Diff Type 12/03/2021 Auto   Final    Hemoglobin 12/03/2021 8.2* 12.0 - 16.0 g/dL Final    Hematocrit 12/03/2021 25.6* 38.0 - 47.0 % Final    Sodium 12/05/2021 145  136 - 145 mmol/L Final    Potassium 12/05/2021 3.5  3.5 - 5.1 mmol/L Final    Chloride 12/05/2021 113* 98 - 107 mmol/L Final    CO2 12/05/2021 24  21 - 32 mmol/L Final    Anion Gap 12/05/2021 12  7 - 16 mmol/L Final    Glucose 12/05/2021 101  74 - 106 mg/dL Final    BUN 12/05/2021 9  7 - 18 mg/dL Final    Creatinine 12/05/2021 0.60  0.55 - 1.02 mg/dL Final    BUN/Creatinine Ratio 12/05/2021 15  6 - 20 Final    Calcium 12/05/2021 7.6* 8.5 - 10.1 mg/dL Final    eGFR  12/05/2021 127  >=60  mL/min/1.73m² Final    WBC 12/05/2021 6.60  4.50 - 11.00 K/uL Final    RBC 12/05/2021 2.70* 4.20 - 5.40 M/uL Final    Hemoglobin 12/05/2021 6.8* 12.0 - 16.0 g/dL Final    Hematocrit 12/05/2021 21.3* 38.0 - 47.0 % Final    MCV 12/05/2021 78.9* 80.0 - 96.0 fL Final    MCH 12/05/2021 25.2* 27.0 - 31.0 pg Final    MCHC 12/05/2021 31.9* 32.0 - 36.0 g/dL Final    RDW 12/05/2021 14.6* 11.5 - 14.5 % Final    Platelet Count 12/05/2021 268  150 - 400 K/uL Final    MPV 12/05/2021 10.5  9.4 - 12.4 fL Final    Neutrophils % 12/05/2021 60.8  53.0 - 65.0 % Final    Lymphocytes % 12/05/2021 23.8* 27.0 - 41.0 % Final    Monocytes % 12/05/2021 13.3* 2.0 - 6.0 % Final    Eosinophils % 12/05/2021 0.2* 1.0 - 4.0 % Final    Basophils % 12/05/2021 0.2  0.0 - 1.0 % Final    Immature Granulocytes % 12/05/2021 1.7* 0.0 - 0.4 % Final    nRBC, Auto 12/05/2021 0.3* <=0.0 % Final    Neutrophils, Abs 12/05/2021 4.02  1.80 - 7.70 K/uL Final    Lymphocytes, Absolute 12/05/2021 1.57  1.00 - 4.80 K/uL Final    Monocytes, Absolute 12/05/2021 0.88* 0.00 - 0.80 K/uL Final    Eosinophils, Absolute 12/05/2021 0.01  0.00 - 0.50 K/uL Final    Basophils, Absolute 12/05/2021 0.01  0.00 - 0.20 K/uL Final    Immature Granulocytes, Absolute 12/05/2021 0.11* 0.00 - 0.04 K/uL Final    nRBC, Absolute 12/05/2021 0.02* <=0.00 x10e3/uL Final    Diff Type 12/05/2021 Auto   Final    Group & Rh 12/05/2021 O POS   Final    Indirect Shira 12/05/2021 NEG   Final    UNIT NUMBER 12/01/2021 [id number]   Final    UNIT ABO/RH 12/01/2021 O POS   Final    DISPENSE STATUS 12/01/2021 Transfused   Final    Unit Expiration 12/01/2021 [id number]   Final    Product Code 12/01/2021 [id number]   Final    Unit Blood Type Code 12/01/2021 5100   Final    CROSSMATCH INTERPRETATION 12/01/2021 Compatible   Final    Sodium 12/06/2021 142  136 - 145 mmol/L Final    Potassium 12/06/2021 3.5  3.5 - 5.1 mmol/L Final    Chloride 12/06/2021 110* 98 - 107 mmol/L  Final    CO2 12/06/2021 23  21 - 32 mmol/L Final    Anion Gap 12/06/2021 13  7 - 16 mmol/L Final    Glucose 12/06/2021 102  74 - 106 mg/dL Final    BUN 12/06/2021 6* 7 - 18 mg/dL Final    Creatinine 12/06/2021 0.52* 0.55 - 1.02 mg/dL Final    BUN/Creatinine Ratio 12/06/2021 12  6 - 20 Final    Calcium 12/06/2021 8.1* 8.5 - 10.1 mg/dL Final    eGFR  12/06/2021 150  >=60 mL/min/1.73m² Final    WBC 12/06/2021 6.96  4.50 - 11.00 K/uL Final    RBC 12/06/2021 3.84* 4.20 - 5.40 M/uL Final    Hemoglobin 12/06/2021 9.7* 12.0 - 16.0 g/dL Final    Hematocrit 12/06/2021 33.5* 38.0 - 47.0 % Final    MCV 12/06/2021 87.2  80.0 - 96.0 fL Final    MCH 12/06/2021 25.3* 27.0 - 31.0 pg Final    MCHC 12/06/2021 29.0* 32.0 - 36.0 g/dL Final    RDW 12/06/2021 14.9* 11.5 - 14.5 % Final    Platelet Count 12/06/2021 333  150 - 400 K/uL Final    MPV 12/06/2021 10.0  9.4 - 12.4 fL Final    Neutrophils % 12/06/2021 70.9* 53.0 - 65.0 % Final    Lymphocytes % 12/06/2021 13.8* 27.0 - 41.0 % Final    Monocytes % 12/06/2021 10.9* 2.0 - 6.0 % Final    Eosinophils % 12/06/2021 1.6  1.0 - 4.0 % Final    Basophils % 12/06/2021 0.4  0.0 - 1.0 % Final    Immature Granulocytes % 12/06/2021 2.4* 0.0 - 0.4 % Final    nRBC, Auto 12/06/2021 0.4* <=0.0 % Final    Neutrophils, Abs 12/06/2021 4.93  1.80 - 7.70 K/uL Final    Lymphocytes, Absolute 12/06/2021 0.96* 1.00 - 4.80 K/uL Final    Monocytes, Absolute 12/06/2021 0.76  0.00 - 0.80 K/uL Final    Eosinophils, Absolute 12/06/2021 0.11  0.00 - 0.50 K/uL Final    Basophils, Absolute 12/06/2021 0.03  0.00 - 0.20 K/uL Final    Immature Granulocytes, Absolute 12/06/2021 0.17* 0.00 - 0.04 K/uL Final    nRBC, Absolute 12/06/2021 0.03* <=0.00 x10e3/uL Final    Diff Type 12/06/2021 Auto   Final   Lab Visit on 12/01/2021   Component Date Value Ref Range Status    Group & Rh 12/01/2021 O POS   Final    Indirect Shira 12/01/2021 NEG   Final    Sodium 12/01/2021 141  136  - 145 mmol/L Final    Potassium 12/01/2021 4.6  3.5 - 5.1 mmol/L Final    Chloride 12/01/2021 108* 98 - 107 mmol/L Final    CO2 12/01/2021 26  21 - 32 mmol/L Final    Anion Gap 12/01/2021 12  7 - 16 mmol/L Final    Glucose 12/01/2021 86  74 - 106 mg/dL Final    BUN 12/01/2021 9  7 - 18 mg/dL Final    Creatinine 12/01/2021 0.67  0.55 - 1.02 mg/dL Final    BUN/Creatinine Ratio 12/01/2021 13  6 - 20 Final    Calcium 12/01/2021 8.8  8.5 - 10.1 mg/dL Final    eGFR  12/01/2021 112  >=60 mL/min/1.73m² Final    PT 12/01/2021 12.1  11.7 - 14.7 seconds Final    INR 12/01/2021 0.89* 0.90 - 1.10 Final    PTT 12/01/2021 33.8  25.2 - 37.3 seconds Final    Color, UA 12/01/2021 Yellow  Colorless, Straw, Yellow, Dark Yellow Final    Clarity, UA 12/01/2021 Clear  Clear Final    pH, UA 12/01/2021 5.5  5.0, 5.5, 6.0, 6.5, 7.0, 7.5, 8.0 pH Units Final    Leukocytes, UA 12/01/2021 Negative  Negative Final    Nitrites, UA 12/01/2021 Negative  Negative Final    Protein, UA 12/01/2021 Negative  Negative Final    Glucose, UA 12/01/2021 Negative  Negative mg/dL Final    Ketones, UA 12/01/2021 Negative  Negative, Trace mg/dL Final    Urobilinogen, UA 12/01/2021 0.2  0.2, 1.0 mg/dL Final    Bilirubin, UA 12/01/2021 Negative  Negative Final    Blood, UA 12/01/2021 Negative  Negative Final    Specific Gravity, UA 12/01/2021 1.025  <=1.005, 1.010, 1.015, 1.020, 1.025, 1.030 Final    HIV 1/2 12/01/2021 Non-Reactive  Non-Reactive Final    Hepatitis C Ab 12/01/2021 Non-Reactive  Non-Reactive Final    Hemoglobin A1C 12/01/2021 6.1  4.5 - 6.6 % Final    Estimated Average Glucose 12/01/2021 117  mg/dL Final   Office Visit on 09/09/2021   Component Date Value Ref Range Status    POC Amphetamines 09/09/2021 Negative  Negative, Inconclusive Final    POC Barbiturates 09/09/2021 Negative  Negative, Inconclusive Final    POC Benzodiazepines 09/09/2021 Negative  Negative, Inconclusive Final    POC Cocaine  09/09/2021 Negative  Negative, Inconclusive Final    POC THC 09/09/2021 Negative  Negative, Inconclusive Final    POC Methadone 09/09/2021 Negative  Negative, Inconclusive Final    POC Methamphetamine 09/09/2021 Negative  Negative, Inconclusive Final    POC Opiates 09/09/2021 Presumptive Positive* Negative, Inconclusive Final    POC Oxycodone 09/09/2021 Negative  Negative, Inconclusive Final    POC Phencyclidine 09/09/2021 Negative  Negative, Inconclusive Final    POC Methylenedioxymethamphetamine * 09/09/2021 Negative  Negative, Inconclusive Final    POC Tricyclic Antidepressants 09/09/2021 Negative  Negative, Inconclusive Final    POC Buprenorphine 09/09/2021 Negative   Final     Acceptable 09/09/2021 Yes   Final    POC Temperature (Urine) 09/09/2021 90   Final   Admission on 09/07/2021, Discharged on 09/08/2021   Component Date Value Ref Range Status    WBC 09/07/2021 6.31  4.50 - 11.00 K/uL Final    RBC 09/07/2021 4.63  4.20 - 5.40 M/uL Final    Hemoglobin 09/07/2021 11.7* 12.0 - 16.0 g/dL Final    Hematocrit 09/07/2021 37.7* 38.0 - 47.0 % Final    MCV 09/07/2021 81.4  80.0 - 96.0 fL Final    MCH 09/07/2021 25.3* 27.0 - 31.0 pg Final    MCHC 09/07/2021 31.0* 32.0 - 36.0 g/dL Final    RDW 09/07/2021 14.0  11.5 - 14.5 % Final    Platelet Count 09/07/2021 317  150 - 400 K/uL Final    MPV 09/07/2021 10.4  9.4 - 12.4 fL Final    Neutrophils % 09/07/2021 60.3  53.0 - 65.0 % Final    Lymphocytes % 09/07/2021 22.8* 27.0 - 41.0 % Final    Monocytes % 09/07/2021 13.3* 2.0 - 6.0 % Final    Eosinophils % 09/07/2021 1.6  1.0 - 4.0 % Final    Basophils % 09/07/2021 0.3  0.0 - 1.0 % Final    Immature Granulocytes % 09/07/2021 1.7* 0.0 - 0.4 % Final    nRBC, Auto 09/07/2021 0.0  <=0.0 % Final    Neutrophils, Abs 09/07/2021 3.80  1.80 - 7.70 K/uL Final    Lymphocytes, Absolute 09/07/2021 1.44  1.00 - 4.80 K/uL Final    Monocytes, Absolute 09/07/2021 0.84* 0.00 - 0.80 K/uL Final     Eosinophils, Absolute 09/07/2021 0.10  0.00 - 0.50 K/uL Final    Basophils, Absolute 09/07/2021 0.02  0.00 - 0.20 K/uL Final    Immature Granulocytes, Absolute 09/07/2021 0.11* 0.00 - 0.04 K/uL Final    nRBC, Absolute 09/07/2021 0.00  <=0.00 x10e3/uL Final    Diff Type 09/07/2021 Auto   Final    Group & Rh 09/07/2021 O POS   Final    Indirect Shira 09/07/2021 NEG   Final    Sodium 09/07/2021 144  136 - 145 mmol/L Final    Potassium 09/07/2021 3.5  3.5 - 5.1 mmol/L Final    Chloride 09/07/2021 107  98 - 107 mmol/L Final    CO2 09/07/2021 26  21 - 32 mmol/L Final    Anion Gap 09/07/2021 15  7 - 16 mmol/L Final    Glucose 09/07/2021 167* 74 - 106 mg/dL Final    BUN 09/07/2021 7  7 - 18 mg/dL Final    Creatinine 09/07/2021 0.65  0.55 - 1.02 mg/dL Final    BUN/Creatinine Ratio 09/07/2021 11  6 - 20 Final    Calcium 09/07/2021 8.5  8.5 - 10.1 mg/dL Final    Total Protein 09/07/2021 6.9  6.4 - 8.2 g/dL Final    Albumin 09/07/2021 3.6  3.5 - 5.0 g/dL Final    Globulin 09/07/2021 3.3  2.0 - 4.0 g/dL Final    A/G Ratio 09/07/2021 1.1   Final    Bilirubin, Total 09/07/2021 0.3  >0.0 - 1.2 mg/dL Final    Alk Phos 09/07/2021 89  55 - 142 U/L Final    ALT 09/07/2021 19  13 - 56 U/L Final    AST 09/07/2021 17  15 - 37 U/L Final    eGFR  09/07/2021 116  >=60 mL/min/1.73m² Final    Sodium 09/08/2021 145  136 - 145 mmol/L Final    Potassium 09/08/2021 3.5  3.5 - 5.1 mmol/L Final    Chloride 09/08/2021 111* 98 - 107 mmol/L Final    CO2 09/08/2021 26  21 - 32 mmol/L Final    Anion Gap 09/08/2021 12  7 - 16 mmol/L Final    Glucose 09/08/2021 109* 74 - 106 mg/dL Final    BUN 09/08/2021 9  7 - 18 mg/dL Final    Creatinine 09/08/2021 0.56  0.55 - 1.02 mg/dL Final    BUN/Creatinine Ratio 09/08/2021 16  6 - 20 Final    Calcium 09/08/2021 8.6  8.5 - 10.1 mg/dL Final    Total Protein 09/08/2021 6.1* 6.4 - 8.2 g/dL Final    Albumin 09/08/2021 3.1* 3.5 - 5.0 g/dL Final    Globulin 09/08/2021  3.0  2.0 - 4.0 g/dL Final    A/G Ratio 09/08/2021 1.0   Final    Bilirubin, Total 09/08/2021 0.3  >0.0 - 1.2 mg/dL Final    Alk Phos 09/08/2021 73  55 - 142 U/L Final    ALT 09/08/2021 17  13 - 56 U/L Final    AST 09/08/2021 19  15 - 37 U/L Final    eGFR  09/08/2021 138  >=60 mL/min/1.73m² Final    WBC 09/08/2021 10.96  4.50 - 11.00 K/uL Final    RBC 09/08/2021 4.27  4.20 - 5.40 M/uL Final    Hemoglobin 09/08/2021 10.7* 12.0 - 16.0 g/dL Final    Hematocrit 09/08/2021 34.6* 38.0 - 47.0 % Final    MCV 09/08/2021 81.0  80.0 - 96.0 fL Final    MCH 09/08/2021 25.1* 27.0 - 31.0 pg Final    MCHC 09/08/2021 30.9* 32.0 - 36.0 g/dL Final    RDW 09/08/2021 14.1  11.5 - 14.5 % Final    Platelet Count 09/08/2021 305  150 - 400 K/uL Final    MPV 09/08/2021 9.7  9.4 - 12.4 fL Final    Neutrophils % 09/08/2021 85.3* 53.0 - 65.0 % Final    Lymphocytes % 09/08/2021 6.8* 27.0 - 41.0 % Final    Monocytes % 09/08/2021 7.3* 2.0 - 6.0 % Final    Eosinophils % 09/08/2021 0.0* 1.0 - 4.0 % Final    Basophils % 09/08/2021 0.1  0.0 - 1.0 % Final    Immature Granulocytes % 09/08/2021 0.5* 0.0 - 0.4 % Final    nRBC, Auto 09/08/2021 0.0  <=0.0 % Final    Neutrophils, Abs 09/08/2021 9.36* 1.80 - 7.70 K/uL Final    Lymphocytes, Absolute 09/08/2021 0.74* 1.00 - 4.80 K/uL Final    Monocytes, Absolute 09/08/2021 0.80  0.00 - 0.80 K/uL Final    Eosinophils, Absolute 09/08/2021 0.00  0.00 - 0.50 K/uL Final    Basophils, Absolute 09/08/2021 0.01  0.00 - 0.20 K/uL Final    Immature Granulocytes, Absolute 09/08/2021 0.05* 0.00 - 0.04 K/uL Final    nRBC, Absolute 09/08/2021 0.00  <=0.00 x10e3/uL Final    Diff Type 09/08/2021 Auto   Final   Lab Visit on 09/01/2021   Component Date Value Ref Range Status    Creatinine 09/01/2021 0.81  0.55 - 1.02 mg/dL Final    eGFR  09/01/2021 90  >=60 mL/min/1.73m² Final    Group & Rh 09/01/2021 O POS   Final    Indirect Shira 09/01/2021 NEG   Final    Office Visit on 09/01/2021   Component Date Value Ref Range Status    POC Rapid COVID 09/01/2021 Negative  Negative Final     Acceptable 09/01/2021 Yes   Final   Hospital Outpatient Visit on 07/13/2021   Component Date Value Ref Range Status    PT 07/13/2021 12.2  11.7 - 14.7 seconds Final    INR 07/13/2021 0.90  0.90 - 1.10 Final    PTT 07/13/2021 25.2  25.2 - 37.3 seconds Final    WBC 07/13/2021 7.36  4.50 - 11.00 K/uL Final    RBC 07/13/2021 4.24  4.20 - 5.40 M/uL Final    Hemoglobin 07/13/2021 10.9* 12.0 - 16.0 g/dL Final    Hematocrit 07/13/2021 35.6* 38.0 - 47.0 % Final    MCV 07/13/2021 84.0  80.0 - 96.0 fL Final    MCH 07/13/2021 25.7* 27.0 - 31.0 pg Final    MCHC 07/13/2021 30.6* 32.0 - 36.0 g/dL Final    RDW 07/13/2021 13.7  11.5 - 14.5 % Final    Platelet Count 07/13/2021 233  150 - 400 K/uL Final    MPV 07/13/2021 11.3  9.4 - 12.4 fL Final    Neutrophils % 07/13/2021 69.0* 53.0 - 65.0 % Final    Lymphocytes % 07/13/2021 16.4* 27.0 - 41.0 % Final    Monocytes % 07/13/2021 10.3* 2.0 - 6.0 % Final    Eosinophils % 07/13/2021 1.9  1.0 - 4.0 % Final    Basophils % 07/13/2021 0.4  0.0 - 1.0 % Final    Immature Granulocytes % 07/13/2021 2.0* 0.0 - 0.4 % Final    nRBC, Auto 07/13/2021 0.0  <=0.0 % Final    Neutrophils, Abs 07/13/2021 5.07  1.80 - 7.70 K/uL Final    Lymphocytes, Absolute 07/13/2021 1.21  1.00 - 4.80 K/uL Final    Monocytes, Absolute 07/13/2021 0.76  0.00 - 0.80 K/uL Final    Eosinophils, Absolute 07/13/2021 0.14  0.00 - 0.50 K/uL Final    Basophils, Absolute 07/13/2021 0.03  0.00 - 0.20 K/uL Final    Immature Granulocytes, Absolute 07/13/2021 0.15* 0.00 - 0.04 K/uL Final    nRBC, Absolute 07/13/2021 0.00  <=0.00 x10e3/uL Final    Diff Type 07/13/2021 Scan Smear   Final    Platelet Morphology 07/13/2021 Platelet Clumping* Normal Final    Ovalocytes 07/13/2021 Few   Final    Polychromasia 07/13/2021 Few   Final    Extra Tube 07/13/2021 Hold for  add-ons.   Final           Assessment:      1. Encounter for long-term (current) use of other medications    2. Lupus    3. Lumbosacral spondylosis without myelopathy            A's of Opioid Risk Assessment  Activity:Patient can perform ADL.   Analgesia:Patients pain is partially controlled by current medication. Patient has tried OTC medications such as Tylenol and Ibuprofen with out relief.   Adverse Effects: Patient denies constipation or sedation.  Aberrant Behavior:  reviewed with no aberrant drug seeking/taking behavior.  Overdose reversal drug naloxone discussed    Drug screen reviewed      Requested Prescriptions     Pending Prescriptions Disp Refills    amitriptyline (ELAVIL) 25 MG tablet 30 tablet 0     Sig: Take 1 tablet (25 mg total) by mouth every evening.    DULoxetine (CYMBALTA) 30 MG capsule 30 capsule 0     Sig: Take 1 capsule (30 mg total) by mouth once daily.    HYDROcodone-acetaminophen (NORCO)  mg per tablet 120 tablet 0     Sig: Take 1 tablet by mouth every 6 (six) hours.         Plan:    Using wheelchair for mobility    Follows spine surgery [facility]    Follows orthopedics [facility]    Complaining right hip and leg pain    Requesting steroid injection    Kenalog 40 mg IM, tolerated well    Continue activity as tolerated    Continue current medication    Follow-up 3 months    Dr. Hernandez, February 2022    Bring original prescription medication bottles/container/box with labels to each visit

## 2022-01-10 ENCOUNTER — OFFICE VISIT (OUTPATIENT)
Dept: SPINE | Facility: CLINIC | Age: 72
End: 2022-01-10
Payer: COMMERCIAL

## 2022-01-10 DIAGNOSIS — M50.30 DDD (DEGENERATIVE DISC DISEASE), CERVICAL: Primary | ICD-10-CM

## 2022-01-10 DIAGNOSIS — M54.16 LUMBAR RADICULOPATHY, CHRONIC: ICD-10-CM

## 2022-01-10 DIAGNOSIS — M43.16 SPONDYLOLISTHESIS, LUMBAR REGION: ICD-10-CM

## 2022-01-10 PROCEDURE — 99024 POSTOP FOLLOW-UP VISIT: CPT | Mod: ,,, | Performed by: ORTHOPAEDIC SURGERY

## 2022-01-10 PROCEDURE — 99214 OFFICE O/P EST MOD 30 MIN: CPT | Mod: PBBFAC | Performed by: ORTHOPAEDIC SURGERY

## 2022-01-10 PROCEDURE — 99024 PR POST-OP FOLLOW-UP VISIT: ICD-10-PCS | Mod: ,,, | Performed by: ORTHOPAEDIC SURGERY

## 2022-01-10 PROCEDURE — 1159F MED LIST DOCD IN RCRD: CPT | Mod: CPTII,,, | Performed by: ORTHOPAEDIC SURGERY

## 2022-01-10 PROCEDURE — 1159F PR MEDICATION LIST DOCUMENTED IN MEDICAL RECORD: ICD-10-PCS | Mod: CPTII,,, | Performed by: ORTHOPAEDIC SURGERY

## 2022-01-11 ENCOUNTER — OFFICE VISIT (OUTPATIENT)
Dept: PAIN MEDICINE | Facility: CLINIC | Age: 72
End: 2022-01-11
Payer: COMMERCIAL

## 2022-01-11 VITALS
DIASTOLIC BLOOD PRESSURE: 60 MMHG | BODY MASS INDEX: 24.91 KG/M2 | WEIGHT: 155 LBS | RESPIRATION RATE: 20 BRPM | HEIGHT: 66 IN | SYSTOLIC BLOOD PRESSURE: 147 MMHG | HEART RATE: 121 BPM

## 2022-01-11 DIAGNOSIS — Z79.899 ENCOUNTER FOR LONG-TERM (CURRENT) USE OF OTHER MEDICATIONS: Primary | ICD-10-CM

## 2022-01-11 DIAGNOSIS — M32.9 LUPUS: Chronic | ICD-10-CM

## 2022-01-11 DIAGNOSIS — M47.817 LUMBOSACRAL SPONDYLOSIS WITHOUT MYELOPATHY: Chronic | ICD-10-CM

## 2022-01-11 PROCEDURE — 1125F PR PAIN SEVERITY QUANTIFIED, PAIN PRESENT: ICD-10-PCS | Mod: CPTII,,, | Performed by: PHYSICIAN ASSISTANT

## 2022-01-11 PROCEDURE — 3078F PR MOST RECENT DIASTOLIC BLOOD PRESSURE < 80 MM HG: ICD-10-PCS | Mod: CPTII,,, | Performed by: PHYSICIAN ASSISTANT

## 2022-01-11 PROCEDURE — 3077F SYST BP >= 140 MM HG: CPT | Mod: CPTII,,, | Performed by: PHYSICIAN ASSISTANT

## 2022-01-11 PROCEDURE — 99214 OFFICE O/P EST MOD 30 MIN: CPT | Mod: S$PBB,,, | Performed by: PHYSICIAN ASSISTANT

## 2022-01-11 PROCEDURE — 3077F PR MOST RECENT SYSTOLIC BLOOD PRESSURE >= 140 MM HG: ICD-10-PCS | Mod: CPTII,,, | Performed by: PHYSICIAN ASSISTANT

## 2022-01-11 PROCEDURE — 96372 THER/PROPH/DIAG INJ SC/IM: CPT | Mod: PBBFAC | Performed by: PHYSICIAN ASSISTANT

## 2022-01-11 PROCEDURE — 1159F MED LIST DOCD IN RCRD: CPT | Mod: CPTII,,, | Performed by: PHYSICIAN ASSISTANT

## 2022-01-11 PROCEDURE — 3288F FALL RISK ASSESSMENT DOCD: CPT | Mod: CPTII,,, | Performed by: PHYSICIAN ASSISTANT

## 2022-01-11 PROCEDURE — 1100F PTFALLS ASSESS-DOCD GE2>/YR: CPT | Mod: CPTII,,, | Performed by: PHYSICIAN ASSISTANT

## 2022-01-11 PROCEDURE — 3078F DIAST BP <80 MM HG: CPT | Mod: CPTII,,, | Performed by: PHYSICIAN ASSISTANT

## 2022-01-11 PROCEDURE — 1159F PR MEDICATION LIST DOCUMENTED IN MEDICAL RECORD: ICD-10-PCS | Mod: CPTII,,, | Performed by: PHYSICIAN ASSISTANT

## 2022-01-11 PROCEDURE — 3008F BODY MASS INDEX DOCD: CPT | Mod: CPTII,,, | Performed by: PHYSICIAN ASSISTANT

## 2022-01-11 PROCEDURE — 99214 OFFICE O/P EST MOD 30 MIN: CPT | Mod: PBBFAC,25 | Performed by: PHYSICIAN ASSISTANT

## 2022-01-11 PROCEDURE — 1100F PR PT FALLS ASSESS DOC 2+ FALLS/FALL W/INJURY/YR: ICD-10-PCS | Mod: CPTII,,, | Performed by: PHYSICIAN ASSISTANT

## 2022-01-11 PROCEDURE — 1125F AMNT PAIN NOTED PAIN PRSNT: CPT | Mod: CPTII,,, | Performed by: PHYSICIAN ASSISTANT

## 2022-01-11 PROCEDURE — 3288F PR FALLS RISK ASSESSMENT DOCUMENTED: ICD-10-PCS | Mod: CPTII,,, | Performed by: PHYSICIAN ASSISTANT

## 2022-01-11 PROCEDURE — 3008F PR BODY MASS INDEX (BMI) DOCUMENTED: ICD-10-PCS | Mod: CPTII,,, | Performed by: PHYSICIAN ASSISTANT

## 2022-01-11 PROCEDURE — 80305 DRUG TEST PRSMV DIR OPT OBS: CPT | Mod: PBBFAC | Performed by: PHYSICIAN ASSISTANT

## 2022-01-11 PROCEDURE — 99214 PR OFFICE/OUTPT VISIT, EST, LEVL IV, 30-39 MIN: ICD-10-PCS | Mod: S$PBB,,, | Performed by: PHYSICIAN ASSISTANT

## 2022-01-11 RX ORDER — AMITRIPTYLINE HYDROCHLORIDE 25 MG/1
25 TABLET, FILM COATED ORAL NIGHTLY
Qty: 30 TABLET | Refills: 2 | Status: SHIPPED | OUTPATIENT
Start: 2022-01-11 | End: 2022-01-24

## 2022-01-11 RX ORDER — DULOXETIN HYDROCHLORIDE 30 MG/1
30 CAPSULE, DELAYED RELEASE ORAL DAILY
Qty: 30 CAPSULE | Refills: 2 | Status: SHIPPED | OUTPATIENT
Start: 2022-01-11 | End: 2022-01-24

## 2022-01-11 RX ORDER — HYDROCODONE BITARTRATE AND ACETAMINOPHEN 10; 325 MG/1; MG/1
1 TABLET ORAL EVERY 6 HOURS
Qty: 120 TABLET | Refills: 0 | Status: SHIPPED | OUTPATIENT
Start: 2022-02-10 | End: 2022-01-24

## 2022-01-11 RX ORDER — HYDROCODONE BITARTRATE AND ACETAMINOPHEN 10; 325 MG/1; MG/1
1 TABLET ORAL EVERY 6 HOURS
Qty: 120 TABLET | Refills: 0 | Status: SHIPPED | OUTPATIENT
Start: 2022-03-12 | End: 2022-01-24

## 2022-01-11 RX ORDER — TRIAMCINOLONE ACETONIDE 40 MG/ML
40 INJECTION, SUSPENSION INTRA-ARTICULAR; INTRAMUSCULAR
Status: COMPLETED | OUTPATIENT
Start: 2022-01-11 | End: 2022-01-11

## 2022-01-11 RX ORDER — HYDROCODONE BITARTRATE AND ACETAMINOPHEN 10; 325 MG/1; MG/1
1 TABLET ORAL EVERY 6 HOURS
Qty: 120 TABLET | Refills: 0 | Status: SHIPPED | OUTPATIENT
Start: 2022-01-11 | End: 2022-02-10

## 2022-01-11 RX ADMIN — TRIAMCINOLONE ACETONIDE 40 MG: 40 INJECTION, SUSPENSION INTRA-ARTICULAR; INTRAMUSCULAR at 02:01

## 2022-01-11 NOTE — PATIENT INSTRUCTIONS
Patient Education       Spondylolysis   About this topic   The spine is made up of bones called vertebrae. These bones are lined up on top of each other. The spinal bones have a large solid part called the body. There is also a romaine arch that goes around your spinal cord. Spondylolysis is a tiny crack, or stress fracture, in this romaine arch. This problem almost always happens in the lower back or the lumbar spine. This arch can crack on one or both sides. If it happens on both sides and it is not treated, it can lead to a more serious problem.     What are the causes?   · Too much stress on the bones in the spine. This often comes from playing sports like gymnastics, football, or weight-lifting.  · Having thin bones. This may be from birth.  · Trauma  · Degenerative problem  · Being overweight  What can make this more likely to happen?   · More common in children and teenagers  · Growth spurts  · Using poor techniques or improper equipment for sports or exercise  What are the main signs?   · Low back pain that is worse with activity and better with rest  · Pain in the buttocks, back of the thighs that may shoot down the leg  · Muscle spasms or tightness in the back or back of the thigh  · Some people have no signs  How does the doctor diagnose this health problem?   The doctor will do an exam and feel around your back. The doctor may have you move and push and pull on your legs to test your motion and strength. Your doctor may have you raise one leg straight up to check if the muscles in the back of your thigh are tight. Your doctor may check the feeling and reflexes in your legs to check for nerve problems. The doctor may order:  · X-ray  · CT or MRI scan  How does the doctor treat this health problem?   · Rest  · Back brace  · Exercises for strengthening and stretching  · Physical therapy (PT) for treatments to lessen pain and for instruction in exercises to help the problem  · Weight loss program if you are  overweight  · Surgery may be needed if pain does not get better or if other problems happen  What drugs may be needed?   The doctor may order drugs to:  · Help with pain and swelling  The doctor may give you a shot to help with pain and swelling. Talk with your doctor about the risks of this shot.   What problems could happen?   · A spinal bone could slip forward onto the spinal bone below. This is spondylolisthesis.  · Chronic back pain  · Nerve damage  · Need for surgery  What can be done to prevent this health problem?   · Stay active and work out to keep your muscles strong and flexible. Keep your back and belly muscles strong and your hamstring muscles flexible.  · Warm up slowly and stretch before you exercise. Use good training techniques and form for sports. Have an expert look at your technique.  · Wear the right equipment when playing sports.  · Use good ways to train, such as slowly adding to how many exercises you do.  · Take breaks often when doing things that use repeat movements.  · Do not exercise or play sports when you are tired or in pain.  · Use extra care in sports with a lot of back bending such as gymnastics, dancing, football, and wrestling.  · Eat a healthy diet to keep your muscles and bones healthy. Eat a diet rich in calcium and vitamin D to keep your bones strong.  · Keep a healthy weight so there is not extra stress on your joints.  Helpful tips   · If you have back pain, do not ignore it. Go to the doctor. The earlier this problem is treated, the better the results.  · Try swimming and biking to stay in shape. These activities put less stress on your back.  Where can I learn more?   [employer]ids[employer]  https://kidshealth.org/en/parents/spondylolysis.html#kha_12   Last Reviewed Date   2020-03-16  Consumer Information Use and Disclaimer   This information is not specific medical advice and does not replace information you receive from your health care provider. This is only a brief summary of  general information. It does NOT include all information about conditions, illnesses, injuries, tests, procedures, treatments, therapies, discharge instructions or life-style choices that may apply to you. You must talk with your health care provider for complete information about your health and treatment options. This information should not be used to decide whether or not to accept your health care providers advice, instructions or recommendations. Only your health care provider has the knowledge and training to provide advice that is right for you.  Copyright   Copyright © 2021 [employer] and its affiliates and/or licensors. All rights reserved.

## 2022-01-11 NOTE — PROGRESS NOTES
MDM/time:  postop    ASSESSMENT:  71 y.o. female with lumbar spondylolisthesis and radiculopathy status post staged L3-L5 lateral fusion followed by L3-S1 laminectomy and fusion, new L1 fracture and fracture to right knee.      PLAN:  Following up with Dr. shannon regarding her knee.  Continue nonweightbearing right lower extremity.  Follow-up in 3 months    HPI:  71 y.o. female here for repeat evaluation of lumbar spondylolisthesis and radiculopathy status post staged L3-L5 lateral fusion followed by L3-S1 laminectomy and fusion.  Still complaining of right leg pain however her pain appears to be improving.  She appears more comfortable today in the clinic.  Has seen Dr. Shannon regarding her right tibial plateau fracture, this appears subacute in nature.    IMAGIN2021 CT myelogram lumbar spine reviewed showed:     There is a subacute compression deformity of the L1 vertebral body with about 10% superior height loss.  No retropulsion.   Postsurgical changes from L3 through S1 posterior fusion and laminectomies.  Lateral fixation with spacers present.  Allograft seen at the L5-S1 disc level.  Additional allograft  seen along the posterior elements.   Hardware appears in the expected location and similar to prior.   There is no obstruction to the flow of intrathecal contrast.  The conus terminates at the L1-L2 disc level.   T12-L1: Calcified central disc protrusion.  No spinal canal or foraminal narrowing.   L1-L2: Disc bulge eccentric to the left without significant spinal canal stenosis.  No foraminal stenosis.   L2-3: No spinal canal or foraminal stenosis.   L3-4: No spinal canal stenosis secondary to laminectomy.  Mild foraminal narrowing.   L4-5: No spinal canal or foraminal narrowing.   L5-S1: No spinal canal or foraminal narrowing.    2021 xray Lumbar spine reviewed showed:   Pedicular screws and rods fuse the L3 through S1 vertebral bodies with stable satisfactory alignment.  There is also  intervertebral hardware at L3-L4 and L4-L5.   There is no acute fracture.  There is mild central depression of the superior endplate of L1 as before.   Presumed gastric stimulator overlies the anterior lower abdomen.   There is a large amount of stool in the colon, suggesting constipation     12/29/2021 X-rays right hip reviewed showed:  No acute fracture or dislocation. Moderate degenerative change of the hips.No radiopaque foreign bodiesLumbar spondylosis with lower lumbar instrumentation noted.  No pelvic ring injuries seen.  No proximal femur fractures seen.  Mild to moderate bilateral hip arthritis.    12/29/2021 xray right knee reviewed showed:   Mildly comminuted fracture posterior tibial plateau.  There is at least 0.8 cm of step-off.   Severe tricompartmental osteoarthrosis  No radiopaque foreign bodies.      Past Medical History:   Diagnosis Date    Achalasia     Asthma     COPD (chronic obstructive pulmonary disease)     Digestive disorder     Gastroesophageal reflux disease without esophagitis 9/21/2020    Gastroparesis 9/21/2020    Hyperlipidemia     Hypertension     Lupus     Mitral valve prolapse     Radiculopathy, cervical region 9/7/2021    Scleroderma     Scoliosis     Thyroid disease      Past Surgical History:   Procedure Laterality Date    ANTERIOR CERVICAL DISCECTOMY W/ FUSION N/A 9/7/2021    Procedure: C3-C4 Anterior cervical discectomy and fusion;  Surgeon: Lucas Rome MD;  Location: [facility] OR;  Service: Neurosurgery;  Laterality: N/A;    BACK SURGERY      FUSION, SPINE, LATERAL APPROACH N/A 12/2/2021    Procedure: L3-L5 lateral interbody fusion;  Surgeon: Lucas Rome MD;  Location: [facility] OR;  Service: Neurosurgery;  Laterality: N/A;    GASTRIC STIMULATOR IMPLANT SURGERY      LUMBAR LAMINECTOMY WITH FUSION N/A 12/3/2021    Procedure: L3-S1 laminectomy and fusion and L5-S1 interbody fusion;  Surgeon: Lucas Rome MD;  Location: [facility] OR;  Service:  Neurosurgery;  Laterality: N/A;    THYROIDECTOMY      TUBAL LIGATION       Social History     Tobacco Use    Smoking status: Never Smoker    Smokeless tobacco: Never Used   Substance Use Topics    Alcohol use: Never    Drug use: Never      Current Outpatient Medications   Medication Instructions    albuterol (PROVENTIL/VENTOLIN HFA) 90 mcg/actuation inhaler 2 puffs, Inhalation, Every 6 hours PRN, Rescue    albuterol-ipratropium (DUO-NEB) 2.5 mg-0.5 mg/3 mL nebulizer solution 3 mLs, Inhalation, Every 4 hours PRN    amitriptyline (ELAVIL) 25 mg, Oral, Nightly    amLODIPine (NORVASC) 10 mg, Oral    aspirin (ECOTRIN) 81 mg, Oral    budesonide-glycopyr-formoterol (BREZTRI AEROSPHERE) 160-9-4.8 mcg/actuation HFAA Inhalation    carvediloL (COREG) 25 mg, 2 times daily    ciprofloxacin HCl (CIPRO) 500 MG tablet No dose, route, or frequency recorded.    DULoxetine (CYMBALTA) 30 mg, Oral, Daily    ferrous gluconate (FERGON) 324 mg, Oral, 2 times daily    furosemide (LASIX) 20 mg, Oral, Daily    gabapentin (NEURONTIN) 800 mg, Oral, 3 times daily    isosorbide dinitrate (ISORDIL) 10 mg, Oral    isosorbide mononitrate (IMDUR) 30 mg, Daily    lansoprazole (PREVACID) 30 MG capsule No dose, route, or frequency recorded.    levothyroxine (SYNTHROID) 75 mcg, Oral, Daily    lisinopriL 10 mg, Oral    mirabegron (MYRBETRIQ) 25 mg Tb24 ER tablet Oral    montelukast (SINGULAIR) 10 mg, Oral, Daily    mycophenolate (CELLCEPT) 500 mg Tab No dose, route, or frequency recorded.    MYRBETRIQ 50 mg Tb24 No dose, route, or frequency recorded.    nitroGLYCERIN (NITROSTAT) 0.4 mg, Sublingual, Every 5 min PRN    olmesartan (BENICAR) 40 mg, Daily    oxyCODONE-acetaminophen (PERCOCET) 5-325 mg per tablet 1 tablet, Oral, Every 4 hours PRN    oxyCODONE-acetaminophen (PERCOCET) 5-325 mg per tablet 1 tablet, Oral, Every 4 hours PRN    potassium chloride (KLOR-CON) 20 mEq Pack 20 mEq, Oral, 2 times daily    promethazine  (PHENERGAN) 25 mg, Oral, Every 6 hours PRN    promethazine (PHENERGAN) 25 mg, Oral, Every 6 hours PRN    QUEtiapine (SEROQUEL) 25 mg, Oral, As needed (PRN)    simvastatin (ZOCOR) 40 mg, Oral    vitamin D (VITAMIN D3) 1,000 Units, Oral, Daily        EXAM:  Constitutional  General Appearance:  There is no height or weight on file to calculate BMI., NAD  Psychiatric   Orientation: Oriented to time, oriented to place, oriented to person  Mood and Affect: Active and alert, normal mood, normal affect    Healed incision  Right leg 3/5 strength and Left leg 5/5  Sensation intact  2+ pulses

## 2022-01-24 RX ORDER — HYDROCODONE BITARTRATE AND ACETAMINOPHEN 10; 325 MG/1; MG/1
1 TABLET ORAL EVERY 6 HOURS
Qty: 120 TABLET | Refills: 0 | Status: SHIPPED | OUTPATIENT
Start: 2022-03-12 | End: 2022-04-11

## 2022-01-24 RX ORDER — AMITRIPTYLINE HYDROCHLORIDE 25 MG/1
25 TABLET, FILM COATED ORAL NIGHTLY
Qty: 30 TABLET | Refills: 2 | Status: SHIPPED | OUTPATIENT
Start: 2022-01-24 | End: 2022-04-06 | Stop reason: SDUPTHER

## 2022-01-24 RX ORDER — DULOXETIN HYDROCHLORIDE 30 MG/1
30 CAPSULE, DELAYED RELEASE ORAL DAILY
Qty: 30 CAPSULE | Refills: 2 | Status: SHIPPED | OUTPATIENT
Start: 2022-01-24 | End: 2022-04-06 | Stop reason: SDUPTHER

## 2022-01-24 RX ORDER — HYDROCODONE BITARTRATE AND ACETAMINOPHEN 10; 325 MG/1; MG/1
1 TABLET ORAL EVERY 6 HOURS
Qty: 120 TABLET | Refills: 0 | Status: SHIPPED | OUTPATIENT
Start: 2022-02-10 | End: 2022-03-12

## 2022-02-14 ENCOUNTER — TELEPHONE (OUTPATIENT)
Dept: PAIN MEDICINE | Facility: CLINIC | Age: 72
End: 2022-02-14

## 2022-02-14 NOTE — TELEPHONE ENCOUNTER
----- Message from Terri Benton sent at 2/10/2022 12:03 PM CST -----  Regarding: RX  Pt. States needs Muscle relaxers called in to Pharmacy is [facility] [place]. Please call pt. @ for any questions [phone].

## 2022-04-05 RX ORDER — GABAPENTIN 800 MG/1
800 TABLET ORAL 3 TIMES DAILY
Qty: 90 TABLET | Refills: 11 | Status: SHIPPED | OUTPATIENT
Start: 2022-04-05 | End: 2023-04-05

## 2022-04-05 NOTE — TELEPHONE ENCOUNTER
----- Message from Mariela Pederson sent at 4/4/2022  1:30 PM CDT -----  Regarding: MED REFILL  PATIENT WAS USING [facility] BUT SWITCHED TO [facility] AND THEY COULDN'T TRANSFER OVER HER REFILLS ON gabapentin (NEURONTIN) 800 MG tablet [[id number]] COULD YOU CALL THAT IN FOR HER?

## 2022-04-06 ENCOUNTER — OFFICE VISIT (OUTPATIENT)
Dept: PAIN MEDICINE | Facility: CLINIC | Age: 72
End: 2022-04-06
Payer: COMMERCIAL

## 2022-04-06 VITALS
BODY MASS INDEX: 24.8 KG/M2 | OXYGEN SATURATION: 98 % | DIASTOLIC BLOOD PRESSURE: 54 MMHG | HEART RATE: 116 BPM | HEIGHT: 63 IN | WEIGHT: 140 LBS | RESPIRATION RATE: 18 BRPM | SYSTOLIC BLOOD PRESSURE: 130 MMHG

## 2022-04-06 DIAGNOSIS — Z79.899 ENCOUNTER FOR LONG-TERM (CURRENT) USE OF OTHER MEDICATIONS: Primary | ICD-10-CM

## 2022-04-06 DIAGNOSIS — M32.9 LUPUS: ICD-10-CM

## 2022-04-06 DIAGNOSIS — M47.817 LUMBOSACRAL SPONDYLOSIS WITHOUT MYELOPATHY: ICD-10-CM

## 2022-04-06 DIAGNOSIS — J44.9 CHRONIC OBSTRUCTIVE PULMONARY DISEASE, UNSPECIFIED COPD TYPE: ICD-10-CM

## 2022-04-06 DIAGNOSIS — M34.9 SCLERODERMA: ICD-10-CM

## 2022-04-06 LAB
CTP QC/QA: YES
POC (AMP) AMPHETAMINE: NEGATIVE
POC (BAR) BARBITURATES: NEGATIVE
POC (BZO) BENZODIAZEPINES: NEGATIVE
POC (COC) COCAINE: NEGATIVE
POC (MDMA) METHYLENEDIOXYMETHAMPHETAMINE 3,4: NEGATIVE
POC (MET) METHAMPHETAMINE: NEGATIVE
POC (MOP) OPIATES: ABNORMAL
POC (MTD) METHADONE: NEGATIVE
POC (OXY) OXYCODONE: NEGATIVE
POC (PCP) PHENCYCLIDINE: NEGATIVE
POC (TCA) TRICYCLIC ANTIDEPRESSANTS: NEGATIVE
POC TEMPERATURE (URINE): 92
POC THC: NEGATIVE

## 2022-04-06 PROCEDURE — 80305 DRUG TEST PRSMV DIR OPT OBS: CPT | Mod: PBBFAC | Performed by: PAIN MEDICINE

## 2022-04-06 PROCEDURE — 3008F BODY MASS INDEX DOCD: CPT | Mod: CPTII,,, | Performed by: PAIN MEDICINE

## 2022-04-06 PROCEDURE — 1101F PT FALLS ASSESS-DOCD LE1/YR: CPT | Mod: CPTII,,, | Performed by: PAIN MEDICINE

## 2022-04-06 PROCEDURE — 99213 PR OFFICE/OUTPT VISIT, EST, LEVL III, 20-29 MIN: ICD-10-PCS | Mod: S$PBB,,, | Performed by: PAIN MEDICINE

## 2022-04-06 PROCEDURE — 3075F SYST BP GE 130 - 139MM HG: CPT | Mod: CPTII,,, | Performed by: PAIN MEDICINE

## 2022-04-06 PROCEDURE — 99213 OFFICE O/P EST LOW 20 MIN: CPT | Mod: S$PBB,,, | Performed by: PAIN MEDICINE

## 2022-04-06 PROCEDURE — 1125F PR PAIN SEVERITY QUANTIFIED, PAIN PRESENT: ICD-10-PCS | Mod: CPTII,,, | Performed by: PAIN MEDICINE

## 2022-04-06 PROCEDURE — 3008F PR BODY MASS INDEX (BMI) DOCUMENTED: ICD-10-PCS | Mod: CPTII,,, | Performed by: PAIN MEDICINE

## 2022-04-06 PROCEDURE — 3075F PR MOST RECENT SYSTOLIC BLOOD PRESS GE 130-139MM HG: ICD-10-PCS | Mod: CPTII,,, | Performed by: PAIN MEDICINE

## 2022-04-06 PROCEDURE — 1159F PR MEDICATION LIST DOCUMENTED IN MEDICAL RECORD: ICD-10-PCS | Mod: CPTII,,, | Performed by: PAIN MEDICINE

## 2022-04-06 PROCEDURE — 3288F FALL RISK ASSESSMENT DOCD: CPT | Mod: CPTII,,, | Performed by: PAIN MEDICINE

## 2022-04-06 PROCEDURE — 1125F AMNT PAIN NOTED PAIN PRSNT: CPT | Mod: CPTII,,, | Performed by: PAIN MEDICINE

## 2022-04-06 PROCEDURE — 3078F PR MOST RECENT DIASTOLIC BLOOD PRESSURE < 80 MM HG: ICD-10-PCS | Mod: CPTII,,, | Performed by: PAIN MEDICINE

## 2022-04-06 PROCEDURE — 1101F PR PT FALLS ASSESS DOC 0-1 FALLS W/OUT INJ PAST YR: ICD-10-PCS | Mod: CPTII,,, | Performed by: PAIN MEDICINE

## 2022-04-06 PROCEDURE — 99215 OFFICE O/P EST HI 40 MIN: CPT | Mod: PBBFAC | Performed by: PAIN MEDICINE

## 2022-04-06 PROCEDURE — 3288F PR FALLS RISK ASSESSMENT DOCUMENTED: ICD-10-PCS | Mod: CPTII,,, | Performed by: PAIN MEDICINE

## 2022-04-06 PROCEDURE — 1159F MED LIST DOCD IN RCRD: CPT | Mod: CPTII,,, | Performed by: PAIN MEDICINE

## 2022-04-06 PROCEDURE — 3078F DIAST BP <80 MM HG: CPT | Mod: CPTII,,, | Performed by: PAIN MEDICINE

## 2022-04-06 RX ORDER — CYCLOBENZAPRINE HCL 10 MG
10 TABLET ORAL 3 TIMES DAILY PRN
Qty: 90 TABLET | Refills: 2 | Status: SHIPPED | OUTPATIENT
Start: 2022-04-06 | End: 2022-07-05

## 2022-04-06 RX ORDER — HYDROCODONE BITARTRATE AND ACETAMINOPHEN 10; 325 MG/1; MG/1
1 TABLET ORAL EVERY 6 HOURS PRN
Qty: 120 TABLET | Refills: 0 | Status: SHIPPED | OUTPATIENT
Start: 2022-04-16 | End: 2022-05-16

## 2022-04-06 RX ORDER — AMITRIPTYLINE HYDROCHLORIDE 25 MG/1
25 TABLET, FILM COATED ORAL NIGHTLY
Qty: 30 TABLET | Refills: 2 | Status: SHIPPED | OUTPATIENT
Start: 2022-04-06

## 2022-04-06 RX ORDER — HYDROCODONE BITARTRATE AND ACETAMINOPHEN 10; 325 MG/1; MG/1
1 TABLET ORAL EVERY 6 HOURS PRN
Qty: 120 TABLET | Refills: 0 | Status: SHIPPED | OUTPATIENT
Start: 2022-06-15 | End: 2022-07-15

## 2022-04-06 RX ORDER — DULOXETIN HYDROCHLORIDE 30 MG/1
30 CAPSULE, DELAYED RELEASE ORAL DAILY
Qty: 30 CAPSULE | Refills: 2 | Status: SHIPPED | OUTPATIENT
Start: 2022-04-06

## 2022-04-06 RX ORDER — HYDROCODONE BITARTRATE AND ACETAMINOPHEN 10; 325 MG/1; MG/1
1 TABLET ORAL EVERY 6 HOURS PRN
Qty: 120 TABLET | Refills: 0 | Status: SHIPPED | OUTPATIENT
Start: 2022-05-16 | End: 2022-06-15

## 2022-04-06 NOTE — PROGRESS NOTES
She Disclaimer: This note has been generated using voice-recognition software. There may be typographical errors that have been missed during proof-reading        Patient ID: Megan Cervantes is a 72 y.o. female.      Chief Complaint: Back Pain      72 year old female returns  for re-evaluation of chronic pain secondary to lupus and scleroderma.  She was recently hospitalized X 6 days  for pneumonia and congestive heart failure.  She continues to experience chronic lower back pain and muscle spasticity. She remains under the care of rheumatologist Dr. Diop and has a pending appointment within the  next several months.  She was seen by Dr. Rome but there was no surgical indication.  She remains on Norco up to 4 times a day for pain relief. Her  pain is adequately controlled and she returns for medication refill.  She is a poor candidate for nerve block injections due to multiple comorbidities              Pain Assessment  Pain Assessment: 0-10  Pain Score: 10-Worst pain ever  Pain Location: Back  Pain Intervention(s): Home medication, Rest, Heat applied      A's of Opioid Risk Assessment  Activity:Patient can not perform ADL.   Analgesia:Patients pain is  controlled by current medication.   Adverse Effects: Patient denies constipation or sedation.  Aberrant Behavior:  reviewed with no aberrant drug seeking/taking behavior.      Patient denies any suicidal or homicidal ideations    Physical Therapy/Home Exercise: yes, in HEP      CT Knee Without Contrast Right  Narrative: EXAMINATION:  CT KNEE WITHOUT CONTRAST RIGHT    CLINICAL HISTORY:  Knee trauma, tibial plateau fracture (Age >= 5y);Displaced bicondylar fracture of right tibia, sequela    TECHNIQUE:  CT KNEE WITHOUT CONTRAST RIGHT    COMPARISON:  Comparisons were reviewed, if available.    FINDINGS:  No acute fracture or dislocation.    Severe tricompartmental osteoarthrosis.    There is sclerosis of the lateral tibial plateau, series 4, image 35.  This  could represent nondisplaced insufficiency fracture or perhaps related to long-standing degenerative change.    Small knee joint effusion.  Subcutaneous soft tissue swelling.  Impression: No acute fracture or dislocation.    There is sclerosis of the lateral tibial plateau, series 4, image 35.  This could represent subacute to chronic nondisplaced insufficiency fracture or perhaps related to long-standing degenerative change.    Electronically signed by: Ubaldo Christy  Date:    01/05/2022  Time:    16:02      Review of Systems   Constitutional: Negative.    HENT: Negative.    Eyes: Negative.    Respiratory: Negative.    Cardiovascular: Negative.    Gastrointestinal: Negative.    Endocrine: Negative.    Genitourinary: Negative.    Musculoskeletal: Positive for arthralgias, back pain, gait problem and myalgias.   Integumentary:  Negative.   Hematological: Negative.    Psychiatric/Behavioral: Positive for sleep disturbance.             Past Medical History:   Diagnosis Date    Achalasia     Asthma     COPD (chronic obstructive pulmonary disease)     Digestive disorder     Gastroesophageal reflux disease without esophagitis 09/21/2020    Gastroparesis 09/21/2020    Hyperlipidemia     Hypertension     Hyperthyroidism     Lupus     Mitral valve prolapse     Radiculopathy, cervical region 09/07/2021    Scleroderma     Scoliosis     Systemic lupus     Thyroid disease      Past Surgical History:   Procedure Laterality Date    ANTERIOR CERVICAL DISCECTOMY W/ FUSION N/A 09/07/2021    Procedure: C3-C4 Anterior cervical discectomy and fusion;  Surgeon: Lucas Rome MD;  Location: [facility] [place] OR;  Service: Neurosurgery;  Laterality: N/A;    BACK SURGERY      FUSION, SPINE, LATERAL APPROACH N/A 12/02/2021    Procedure: L3-L5 lateral interbody fusion;  Surgeon: Lucas Rome MD;  Location: [facility] [place] OR;  Service: Neurosurgery;  Laterality: N/A;    GASTRIC STIMULATOR IMPLANT SURGERY      L4-L5 ARACELY  2021     1-4-2021  DR RODRIGUEZ X1    LUMBAR LAMINECTOMY WITH FUSION N/A 12/03/2021    Procedure: L3-S1 laminectomy and fusion and L5-S1 interbody fusion;  Surgeon: Lucas Rome MD;  Location: [facility] [place];  Service: Neurosurgery;  Laterality: N/A;    THYROIDECTOMY      TUBAL LIGATION       Social History     Socioeconomic History    Marital status:    Tobacco Use    Smoking status: Never Smoker    Smokeless tobacco: Never Used   Substance and Sexual Activity    Alcohol use: Never    Drug use: Never     Family History   Problem Relation Age of Onset    Cancer Mother     Diabetes Brother      Review of patient's allergies indicates:   Allergen Reactions    Strawberry Itching and Swelling     Other reaction(s): Unknown    Iodine      Other reaction(s): Unknown    Iodine and iodide containing products     Lyrica [pregabalin]      Other reaction(s): Unknown    Milk containing products     Penicillin v potassium      Other reaction(s): Unknown    Penicillins     Pineapple     Shellfish containing products      Other reaction(s): Unknown    Vistaril [hydroxyzine hcl]      Other reaction(s): Unknown    Aspirin Nausea And Vomiting     May take coated aspirin    Latex, natural rubber Rash     has a current medication list which includes the following prescription(s): albuterol, albuterol-ipratropium, amitriptyline, amlodipine, aspirin, breztri aerosphere, carvedilol, ciprofloxacin hcl, duloxetine, ferrous gluconate, furosemide, gabapentin, hydrocodone-acetaminophen, isosorbide dinitrate, isosorbide mononitrate, lansoprazole, levothyroxine, lisinopril, myrbetriq, montelukast, mycophenolate, myrbetriq, nitroglycerin, olmesartan, oxycodone-acetaminophen, oxycodone-acetaminophen, potassium chloride, promethazine, promethazine, quetiapine, simvastatin, and vitamin d.      Objective:  Vitals:    04/06/22 1245   BP: (!) 130/54   Pulse: (!) 116   Resp:         Physical Exam  Vitals and nursing note reviewed.    Constitutional:       General: She is not in acute distress.     Appearance: Normal appearance. She is not ill-appearing, toxic-appearing or diaphoretic.   HENT:      Head: Normocephalic and atraumatic.      Nose: Nose normal.      Mouth/Throat:      Mouth: Mucous membranes are moist.   Eyes:      Extraocular Movements: Extraocular movements intact.      Pupils: Pupils are equal, round, and reactive to light.   Cardiovascular:      Rate and Rhythm: Normal rate and regular rhythm.      Heart sounds: Normal heart sounds.   Pulmonary:      Effort: Pulmonary effort is normal. No respiratory distress.      Breath sounds: Normal breath sounds. No stridor. No wheezing or rhonchi.   Abdominal:      General: Bowel sounds are normal.      Palpations: Abdomen is soft.   Musculoskeletal:         General: No swelling or deformity.      Cervical back: Normal and normal range of motion. No spasms or tenderness. No pain with movement. Normal range of motion.      Thoracic back: Normal.      Lumbar back: Tenderness and bony tenderness present. No spasms. Decreased range of motion. Negative right straight leg raise test and negative left straight leg raise test. No scoliosis.      Right lower leg: No edema.      Left lower leg: No edema.   Skin:     General: Skin is warm.   Neurological:      General: No focal deficit present.      Mental Status: She is alert and oriented to person, place, and time. Mental status is at baseline.      Cranial Nerves: Cranial nerves are intact. No cranial nerve deficit.      Sensory: Sensation is intact. No sensory deficit.      Motor: No weakness.      Coordination: Coordination normal.      Gait: Gait abnormal.      Deep Tendon Reflexes: Reflexes are normal and symmetric.      Comments: Uses a walker for mobility   Psychiatric:         Mood and Affect: Mood normal.         Behavior: Behavior normal.           Assessment:      1. Encounter for long-term (current) use of other medications    2.  Lumbosacral spondylosis without myelopathy    3. Lupus    4. Chronic obstructive pulmonary disease, unspecified COPD type    5. Scleroderma          Plan:  1. reviewed  2.Addiction, Dependency, Tolerance, Opioid abuse-misuse, Death, Diversion Discussed. Overdose reversal drug Naloxone discussed.  3.Refill/Continue medications for pain control and function       Requested Prescriptions     Pending Prescriptions Disp Refills    HYDROcodone-acetaminophen (NORCO)  mg per tablet 120 tablet 0     Sig: Take 1 tablet by mouth every 6 (six) hours as needed for Pain (pain).    HYDROcodone-acetaminophen (NORCO)  mg per tablet 120 tablet 0     Sig: Take 1 tablet by mouth every 6 (six) hours as needed for Pain (pain).    HYDROcodone-acetaminophen (NORCO)  mg per tablet 120 tablet 0     Sig: Take 1 tablet by mouth every 6 (six) hours as needed for Pain (pain).    cyclobenzaprine (FLEXERIL) 10 MG tablet 90 tablet 2     Sig: Take 1 tablet (10 mg total) by mouth 3 (three) times daily as needed for Muscle spasms.    amitriptyline (ELAVIL) 25 MG tablet 30 tablet 2     Sig: Take 1 tablet (25 mg total) by mouth every evening.    DULoxetine (CYMBALTA) 30 MG capsule 30 capsule 2     Sig: Take 1 capsule (30 mg total) by mouth once daily.     4.Urine drug screen point of care obtained and consistent with prescribed medications and medication refill date    Orders Placed This Encounter   Procedures    POCT Urine Drug Screen Presump     Interpretive Information:     Negative:  No drug detected at the cut off level.   Positive:  This result represents presumptive positive for the   tested drug, other substances may yield a positive response other   than the analyte of interest. This result should be utilized for   diagnostic purpose only. Confirmation testing will be performed upon physician request only.         5.Patient defers nerve block injections, physical therapy or surgical consultation  6.Follow with DNasir  JANETTE Hollingsworth in 3 months for re-evaluation and medication refill         report:  Reviewed and consistent with medication use as prescribed.      The total time spent for evaluation and management on 04/06/2022 including reviewing separately obtained history, performing a medically appropriate exam and evaluation, documenting clinical information in the health record, independently interpreting results and communicating them to the patient/family/caregiver, and ordering medications/tests/procedures was between 15-29 minutes.    The above plan and management options were discussed at length with patient. Patient is in agreement with the above and verbalized understanding. It will be communicated with the referring physician via electronic record, fax, or mail.

## 2022-04-22 DIAGNOSIS — Z09 FOLLOW-UP EXAMINATION: Primary | ICD-10-CM

## 2022-05-10 DIAGNOSIS — Z09 FOLLOW-UP SURGERY CARE: Primary | ICD-10-CM

## (undated) DEVICE — SPONGE XRAY DETECT 4X4IN PK/10

## (undated) DEVICE — GOWN SURGICAL SMARTGOWN LEVEL 4 / EXTRA LARGE STERILE

## (undated) DEVICE — FLEXIBLE YANKAUER SUCTION CATH

## (undated) DEVICE — GLOVE SURGICAL PROTEXIS PI SIZE 6

## (undated) DEVICE — VALLEYLAB BIPOLAR FORCEPS CORD 12FT

## (undated) DEVICE — SOL IRRIGATION SALINE 0.9% 1000ML BOTTLE

## (undated) DEVICE — MARKER SURGICAL PEN & RULER STERILE

## (undated) DEVICE — SUTURE STRATAFIX VIOLET CT-1 45CM

## (undated) DEVICE — ORTHO SPINE DISPOSABLE NEURO PACK

## (undated) DEVICE — SUTURE BONE WAX (W31G) 2.5 GM

## (undated) DEVICE — TOWEL OR STERILE BLUE 4/PK 20PK/CS

## (undated) DEVICE — NEEDLE SPINAL 18G X 3 1/2IN

## (undated) DEVICE — SYRINGE ASEPTO IRRIGATION BULB 60CC LF DISP

## (undated) DEVICE — PENCIL HANDSWITCHING ROCKER SW

## (undated) DEVICE — SUTURE STRATAFIX PGA 2-0 26CM

## (undated) DEVICE — DRAPE COVER C-ARM C-ARMOR 42IN X 74IN DISP STERILE

## (undated) DEVICE — GOWN SURGICAL STERILE LEVEL 3 / XX-LARGE

## (undated) DEVICE — SPONGE LAPAROTOMY SURGICAL 1X1 X RAY DETECTABLE

## (undated) DEVICE — FOAM POSITIONER ARM SURGICAL

## (undated) DEVICE — APPLICATOR CHLORAPREP HI-LITE TINTED ORANGE 26ML

## (undated) DEVICE — GLOVE SURGICAL PROTEXIS PI BLUE SIZE 6.0

## (undated) DEVICE — BIPOLAR AQUAMANTYS SEALER 6.0

## (undated) DEVICE — FLOSEAL MATRIX HEMOSTATIC 10ML

## (undated) DEVICE — SPONGE PEANUT 3/8IN X-RAY DETECTABLE W/COUNT CARD STERILE

## (undated) DEVICE — SEALANT FLOSEAL HEMOSTATIC 5ML

## (undated) DEVICE — BLADE BOVIE INSULATED COATED 2.75IN

## (undated) DEVICE — CATH IV AUTOGUARD 14GX1 3/4IN

## (undated) DEVICE — SPONGE GAUZE 4X4 12 PLY STL AMD 10/TRAY

## (undated) DEVICE — PRINEO SKIN CLOSURE DERMABOND 22CM

## (undated) DEVICE — DRESSING IV TEGADERM 10X12CM

## (undated) DEVICE — DRAPE STERI INCISE 1050 23 X 17IN

## (undated) DEVICE — BLADE SURG SS SZ 10

## (undated) DEVICE — TUBING SUCTION 5MM STERILE 3/16IN X 12FT

## (undated) DEVICE — FORCEP BAYONET BIPOLAR 10.5 DISP

## (undated) DEVICE — GLOVE SURGICAL PROTEXIS PI BLUE SIZE 8.5

## (undated) DEVICE — BUR MATCH HEAD SOFT 2.0

## (undated) DEVICE — GLOVE SURGICAL PROTEXIS PI SIZE 8.5

## (undated) DEVICE — BLADE CARBON SURG SS SZ 15 STERILE

## (undated) DEVICE — U-BAR CHESTPACK III

## (undated) DEVICE — BAG-A-JET FLUID DISPENSER SYSTEM

## (undated) DEVICE — NEEDLE SPINAL 20G X 3 1/2IN

## (undated) DEVICE — GLOVE SURGICAL PROTEXIS PI SIZE 7

## (undated) DEVICE — DRAPE STERI SURGICAL TOWEL 1000

## (undated) DEVICE — COVER LIGHT HANDLE FLEX PK/2

## (undated) DEVICE — SUTURE STRATAFIX CP-2 24CM X 24CX

## (undated) DEVICE — COVER OVERHEAD TABLE TIBURON 80 X 90""

## (undated) DEVICE — COUNTER SHARPS FOAM BLOCK MAGNET 40-70

## (undated) DEVICE — SUTURE MONOCRYL 4-0 27IN

## (undated) DEVICE — WOUND DRAINAGE KIT MEDIUM 10

## (undated) DEVICE — DRAPE SURGICAL 3/4 SHEET 56 X 77" STERILE"

## (undated) DEVICE — TRAY FOLEY CATH 16FR SILICONE LUBRI-SIL W/DRAINAGE BAG

## (undated) DEVICE — DRAPE FLUORO C ARM 36X30IN

## (undated) DEVICE — GOWN LARGE NON REINFORCED

## (undated) DEVICE — BUR ELITE PRECISION NEURO 3.0

## (undated) DEVICE — PIN DISTRACTION SSA 14MM

## (undated) DEVICE — SYRINGE 20CC LL PLASTIC  BX/48

## (undated) DEVICE — IMP DISPOSABLE PINS
Type: IMPLANTABLE DEVICE | Site: BACK | Status: NON-FUNCTIONAL
Removed: 2021-12-02

## (undated) DEVICE — SUTURE SILK 3-0 18 BLACK TIES

## (undated) DEVICE — CLEANER TIP BOVIE ELECTROSURG

## (undated) DEVICE — COUNTER SHARPS FOAM BLOCK MAGNET 20-40

## (undated) DEVICE — COLLAR VERVICAL SOFT LARGE MD

## (undated) DEVICE — Device

## (undated) DEVICE — FILM IOBAN ANTIMICROBL 35X35CM

## (undated) DEVICE — SYRINGE 10-12CC LURE -LOK TIP

## (undated) DEVICE — ORTHO SPINE DISPOSABLE PACK

## (undated) DEVICE — SUTURE VICRYL 1 CT-1 UD BR 27IN

## (undated) DEVICE — SUPPORT HEADREST DO-NUT

## (undated) DEVICE — POSITIONER HEADREST FOAM 11 X 10 X 6